# Patient Record
Sex: FEMALE | Race: WHITE | Employment: OTHER | URBAN - METROPOLITAN AREA
[De-identification: names, ages, dates, MRNs, and addresses within clinical notes are randomized per-mention and may not be internally consistent; named-entity substitution may affect disease eponyms.]

---

## 2020-07-08 ENCOUNTER — OFFICE VISIT (OUTPATIENT)
Dept: PODIATRY | Age: 70
End: 2020-07-08
Payer: COMMERCIAL

## 2020-07-08 VITALS — WEIGHT: 160 LBS | BODY MASS INDEX: 26.66 KG/M2 | HEIGHT: 65 IN

## 2020-07-08 PROCEDURE — 99203 OFFICE O/P NEW LOW 30 MIN: CPT | Performed by: PODIATRIST

## 2020-07-08 PROCEDURE — 17110 DESTRUCTION B9 LES UP TO 14: CPT | Performed by: PODIATRIST

## 2020-07-08 PROCEDURE — 11721 DEBRIDE NAIL 6 OR MORE: CPT | Performed by: PODIATRIST

## 2020-07-08 RX ORDER — AMOXICILLIN 500 MG/1
CAPSULE ORAL
COMMUNITY
Start: 2020-07-03 | End: 2020-09-10 | Stop reason: ALTCHOICE

## 2020-07-08 RX ORDER — CLOBETASOL PROPIONATE 0.5 MG/G
CREAM TOPICAL
COMMUNITY
Start: 2020-05-22

## 2020-07-09 RX ORDER — FLUOCINONIDE 0.5 MG/G
OINTMENT TOPICAL
Qty: 60 G | Refills: 2 | Status: SHIPPED | OUTPATIENT
Start: 2020-07-09 | End: 2021-03-17 | Stop reason: ALTCHOICE

## 2020-07-09 ASSESSMENT — ENCOUNTER SYMPTOMS
DIARRHEA: 0
VOMITING: 0
COLOR CHANGE: 1
CONSTIPATION: 0
NAUSEA: 0

## 2020-07-09 NOTE — PROGRESS NOTES
[facility]  New Patient History and Physical    Chief Complaint:   Chief Complaint   Patient presents with    Callouses     callouses to both feet    Hammer Toe     hammertoes b/l    Second Opinion       HPI: Naty Sanchez is a 68 y.o. female who presents to the office today complaining of several things    1) thick, toenails, she is a diabetic, toes are also painful. Saw another podiatrist. Jannifer Form are getting worse, wanted a 2nd opinion    2) dry, scaly, thick skin, both feet, cracks, has psoriasis. Uses carmol 40, used vaseline, under occlusion, this helped    3) painful spots right heel, right sub 1st met head    4) hammertoes, right worse than left, corn on toe, 3rd toe, very painful. Has spacers, toe crests, silicone toe caps, met pads, has insoles, not wearing them. Currently denies F/C/N/V. Allergies   Allergen Reactions    Latex        Past Medical History:   Diagnosis Date    Arthritis     Bilateral carpal tunnel syndrome     Chronic kidney disease     Diabetes mellitus ([facility] [address]     Diabetic nephropathy (HCC)     Hyperlipemia     Night cramps     Osteopenia     Seizure disorder (HCC)     Tingling in extremities        Prior to Admission medications    Medication Sig Start Date End Date Taking? Authorizing Provider   fluocinonide (LIDEX) 0.05 % ointment Apply topically 2 times daily.  7/9/20  Yes Maureen Smith DPM   clobetasol (TEMOVATE) 0.05 % cream  5/22/20  Yes Historical Provider, MD   L-Methylfolate-Algae-B12-B6 (METANX PO) Take by mouth   Yes Historical Provider, MD   lisinopril (PRINIVIL;ZESTRIL) 2.5 MG tablet Take 2.5 mg by mouth daily   Yes Historical Provider, MD   simvastatin (ZOCOR) 20 MG tablet Take 20 mg by mouth nightly   Yes Historical Provider, MD   potassium chloride (KLOR-CON M) 20 MEQ TBCR extended release tablet Take 20 mEq by mouth daily (with breakfast)   Yes Historical Provider, MD   metFORMIN (GLUCOPHAGE) 500 MG tablet Take 500 mg by mouth 2 times daily (with meals)   Yes Historical Provider, MD   SITagliptin (JANUVIA) 50 MG tablet Take 50 mg by mouth daily   Yes Historical Provider, MD   Multiple Vitamins-Minerals (CENTRUM SILVER 50+WOMEN) TABS Take by mouth daily   Yes Historical Provider, MD   Magnesium 500 MG TABS Take 500 mg by mouth daily   Yes Historical Provider, MD   aspirin 81 MG tablet Take 81 mg by mouth daily   Yes Historical Provider, MD   NONFORMULARY Take by mouth daily Neora Vitamin   Yes Historical Provider, MD   amoxicillin (AMOXIL) 500 MG capsule  7/3/20   Historical Provider, MD       Past Surgical History:   Procedure Laterality Date    CATARACT REMOVAL Bilateral        History reviewed. No pertinent family history. Social History     Tobacco Use    Smoking status: Never Smoker    Smokeless tobacco: Never Used   Substance Use Topics    Alcohol use: Never     Frequency: Never       Review of Systems   Constitutional: Negative for chills, diaphoresis, fatigue, fever and unexpected weight change. Cardiovascular: Negative for leg swelling. Gastrointestinal: Negative for constipation, diarrhea, nausea and vomiting. Musculoskeletal: Positive for gait problem. Negative for arthralgias and joint swelling. Skin: Positive for color change, rash and wound. Negative for pallor. Neurological: Positive for numbness. Negative for weakness. Lower Extremity Physical Examination:     Vitals: There were no vitals filed for this visit. General: AAO x 3 in NAD.      Vascular: DP and PT pulses palpable 2/4, Bilateral.  CFT <3 seconds, Bilateral.  Hair growth absent to the level of the digits, Bilateral.  Edema absent, Bilateral.  Varicosities absent, Bilateral. Erythema absent, Bilateral    Neurological:  Sensation present to light touch to level of digits, Bilateral.  Protective sensation absent via 5.07/10g [place] monofilament 3/10 sites, Bilateral.  Vibratory sensation present to 1st MPJ, Bilateral.     Musculoskeletal: personalized discharge instructions. Nails 1-10 were debrided sharply in length and thickness with a nipper and , without incident. Pt will follow up in 4 weeks or sooner if any problems arise. Diagnosis was discussed with the pt and all of their questions were answered in detail. Proper foot hygiene and care was discussed with the pt. Informed patient on proper diabetic foot care and importance of tight glycemic control. Patient to check feet daily and contact the office with any questions/problems/concerns. Other comorbidity noted and will be managed by PCP. Diabetic foot examination performed this visit. The exam included neurological sensory exam, a 10-g monofilament and pinprick sensation, vibration using a 128-Hz tuning fork, ankle reflexes, visual skin inspection, vascular exam including assessment of pedal pulses, orthopedic exam for deformities, and shoe inspection. Increased risk factors noted on the diabetic foot exam include decreased sensory exam and peripheral neuropathy. Shoegear inspected and found to be appropriate size and wear. Diabetic shoes and insoles: This patient would benefit from extra depth diabetic shoes and custom inserts. I feel he/she qualifies due to the above performed physical exam and diagnosis. I will do the appropriate paperwork and send it to their primary care physician. Then the patient will be measured for shoes and custom inserts to properly off load and protect his/her feet. Will discuss shoes at next visit  Will bring her other insoles and shoes to next visit    The lesion(s) was partially debrided, enucleated, and silver nitrate was applied with an apperature pad under occlusion. The patient will leave in place for 24-48 hours and than remove. The patient tolerated the procedure well and without complication.        Orders Placed This Encounter   Procedures    [place] DEBRIDEMENT OF NAILS, 6 OR MORE    49541 - [place] DESTRUCTION BENIGN LESIONS UP TO 14 Orders Placed This Encounter   Medications    fluocinonide (LIDEX) 0.05 % ointment     Sig: Apply topically 2 times daily. Dispense:  60 g     Refill:  2        RTC in 4week(s).     7/8/2020

## 2020-07-29 ENCOUNTER — OFFICE VISIT (OUTPATIENT)
Dept: PODIATRY | Age: 70
End: 2020-07-29
Payer: COMMERCIAL

## 2020-07-29 VITALS — WEIGHT: 156 LBS | BODY MASS INDEX: 25.99 KG/M2 | HEIGHT: 65 IN

## 2020-07-29 PROCEDURE — 17110 DESTRUCTION B9 LES UP TO 14: CPT | Performed by: PODIATRIST

## 2020-07-29 PROCEDURE — 99213 OFFICE O/P EST LOW 20 MIN: CPT | Performed by: PODIATRIST

## 2020-07-30 ASSESSMENT — ENCOUNTER SYMPTOMS
NAUSEA: 0
COLOR CHANGE: 1
VOMITING: 0
DIARRHEA: 0
CONSTIPATION: 0

## 2020-07-30 NOTE — PROGRESS NOTES
[facility]  Return Patient History and Physical    Chief Complaint:   Chief Complaint   Patient presents with   Mickiel Pelt     right foot        HPI: Garrett Navarro is a 68 y.o. female who presents to the office today for follow up of several things    1) thick, toenails, doing better after debridement, she is a diabetic, toes are also painful. Saw another podiatrist. Candice Daniels are getting worse, wanted a 2nd opinion    2) dry, scaly, thick skin, both feet, better with lidex ointment at night, cream in the morning. cracks, has psoriasis. Uses carmol 40, used vaseline, under occlusion, this helped    3) painful spots right heel, right sub 1st met head-better after debriement. 4) hammertoes, right 3rd toe is still painful with a corn. right worse than left, corn on toe, 3rd toe, very painful. Has spacers, toe crests, silicone toe caps, met pads, has insoles, not wearing them. Currently denies F/C/N/V. Allergies   Allergen Reactions    Latex        Past Medical History:   Diagnosis Date    Arthritis     Bilateral carpal tunnel syndrome     Chronic kidney disease     Diabetes mellitus ([facility] [address]     Diabetic nephropathy (HCC)     Hyperlipemia     Night cramps     Osteopenia     Seizure disorder (HCC)     Tingling in extremities        Prior to Admission medications    Medication Sig Start Date End Date Taking? Authorizing Provider   fluocinonide (LIDEX) 0.05 % ointment Apply topically 2 times daily.  7/9/20  Yes Zak Umanzor DPM   clobetasol (TEMOVATE) 0.05 % cream  5/22/20  Yes Historical Provider, MD   L-Methylfolate-Algae-B12-B6 (METANX PO) Take by mouth   Yes Historical Provider, MD   lisinopril (PRINIVIL;ZESTRIL) 2.5 MG tablet Take 2.5 mg by mouth daily   Yes Historical Provider, MD   simvastatin (ZOCOR) 20 MG tablet Take 20 mg by mouth nightly   Yes Historical Provider, MD   potassium chloride (KLOR-CON M) 20 MEQ TBCR extended release tablet Take 20 mEq by mouth daily (with breakfast)   Yes Historical Provider, MD   metFORMIN (GLUCOPHAGE) 500 MG tablet Take 500 mg by mouth 2 times daily (with meals)   Yes Historical Provider, MD   SITagliptin (JANUVIA) 50 MG tablet Take 50 mg by mouth daily   Yes Historical Provider, MD   Multiple Vitamins-Minerals (CENTRUM SILVER 50+WOMEN) TABS Take by mouth daily   Yes Historical Provider, MD   Magnesium 500 MG TABS Take 500 mg by mouth daily   Yes Historical Provider, MD   aspirin 81 MG tablet Take 81 mg by mouth daily   Yes Historical Provider, MD   NONFORMULARY Take by mouth daily Neora Vitamin   Yes Historical Provider, MD   amoxicillin (AMOXIL) 500 MG capsule  7/3/20   Historical Provider, MD       Past Surgical History:   Procedure Laterality Date    CATARACT REMOVAL Bilateral        No family history on file. Social History     Tobacco Use    Smoking status: Never Smoker    Smokeless tobacco: Never Used   Substance Use Topics    Alcohol use: Never     Frequency: Never       Review of Systems   Constitutional: Negative for chills, diaphoresis, fatigue, fever and unexpected weight change. Cardiovascular: Negative for leg swelling. Gastrointestinal: Negative for constipation, diarrhea, nausea and vomiting. Musculoskeletal: Positive for gait problem. Negative for arthralgias and joint swelling. Skin: Positive for color change, rash and wound. Negative for pallor. Neurological: Positive for numbness. Negative for weakness. Lower Extremity Physical Examination:     Vitals: There were no vitals filed for this visit. General: AAO x 3 in NAD.      Vascular: DP and PT pulses palpable 2/4, Bilateral.  CFT <3 seconds, Bilateral.  Hair growth absent to the level of the digits, Bilateral.  Edema absent, Bilateral.  Varicosities absent, Bilateral. Erythema absent, Bilateral    Neurological:  Sensation present to light touch to level of digits, Bilateral.  Protective sensation absent via 5.07/10g [place] monofilament 3/10 sites, Bilateral.  Vibratory sensation present to 1st MPJ, Bilateral.     Musculoskeletal: Muscle strength 5/5, Bilateral.  Contracted toes 2-5 right, HAV bilateral    Integument: Warm, dry, supple, Bilateral.  Open lesion absent, Bilateral.  Hyperkeratotic skin in patches bilateral feet, red, scaly, moccasin distribution. Hyperkeratotic lesions plantar right heel improved, plantar right first met head improved, tip of right 3rd toe with hard central painful core still present. Gait analysis:     Asessment: Patient is a 68 y.o. female with:   1. Psoriasis of nail    2. Psoriasis    3. Benign neoplasm of skin of foot, right    4. Pain in right foot    5. Hammer toe of right foot    6. Corn of toe    7. Controlled type 2 diabetes with neuropathy ([facility] [place] [address]          Plan: Pt was evaluated and examined. Patient was given personalized discharge instructions. Pt will follow up in 4 weeks or sooner if any problems arise. Diagnosis was discussed with the pt and all of their questions were answered in detail. Proper foot hygiene and care was discussed with the pt. Informed patient on proper diabetic foot care and importance of tight glycemic control. Patient to check feet daily and contact the office with any questions/problems/concerns. Other comorbidity noted and will be managed by PCP. Diabetic foot examination performed this visit. The exam included neurological sensory exam, a 10-g monofilament and pinprick sensation, vibration using a 128-Hz tuning fork, ankle reflexes, visual skin inspection, vascular exam including assessment of pedal pulses, orthopedic exam for deformities, and shoe inspection. Increased risk factors noted on the diabetic foot exam include decreased sensory exam and peripheral neuropathy. Shoegear inspected and found to be appropriate size and wear.     Modified her insoles for her shoes with pink plastazote  Discussed flexor tenotomy for the right 3rd to to try to help with pressure on the

## 2020-08-27 ENCOUNTER — PROCEDURE VISIT (OUTPATIENT)
Dept: PODIATRY | Age: 70
End: 2020-08-27
Payer: COMMERCIAL

## 2020-08-27 VITALS — WEIGHT: 156 LBS | BODY MASS INDEX: 25.99 KG/M2 | HEIGHT: 65 IN

## 2020-08-27 PROCEDURE — 28232 INCISION OF TOE TENDON: CPT | Performed by: PODIATRIST

## 2020-08-27 PROCEDURE — 99999 PR OFFICE/OUTPT VISIT,PROCEDURE ONLY: CPT | Performed by: PODIATRIST

## 2020-08-27 ASSESSMENT — ENCOUNTER SYMPTOMS
VOMITING: 0
NAUSEA: 0
DIARRHEA: 0
COLOR CHANGE: 0
CONSTIPATION: 0

## 2020-08-27 NOTE — PROGRESS NOTES
[facility]  Return Patient     Garett Santana 68 y.o. female that presents for follow-up of   Chief Complaint   Patient presents with    Procedure     RIGHT FOOT 3RD TOE TENOTOMY     Here for flexor tenotomy of her right 3rd toe. hammertoes, right 3rd toe is still painful with a corn. right worse than left, corn on toe, 3rd toe, very painful. Has spacers, toe crests, silicone toe     Currently denies F/C/N/V. Allergies   Allergen Reactions    Latex        Past Medical History:   Diagnosis Date    Arthritis     Bilateral carpal tunnel syndrome     Chronic kidney disease     Diabetes mellitus ([address]     Diabetic nephropathy (HCC)     Hyperlipemia     Night cramps     Osteopenia     Seizure disorder (HCC)     Tingling in extremities        Prior to Admission medications    Medication Sig Start Date End Date Taking? Authorizing Provider   fluocinonide (LIDEX) 0.05 % ointment Apply topically 2 times daily.  7/9/20  Yes Saman Bay DPM   amoxicillin (AMOXIL) 500 MG capsule  7/3/20  Yes Historical Provider, MD   clobetasol (TEMOVATE) 0.05 % cream  5/22/20  Yes Historical Provider, MD   L-Methylfolate-Algae-B12-B6 (METANX PO) Take by mouth   Yes Historical Provider, MD   lisinopril (PRINIVIL;ZESTRIL) 2.5 MG tablet Take 2.5 mg by mouth daily   Yes Historical Provider, MD   simvastatin (ZOCOR) 20 MG tablet Take 20 mg by mouth nightly   Yes Historical Provider, MD   potassium chloride (KLOR-CON M) 20 MEQ TBCR extended release tablet Take 20 mEq by mouth daily (with breakfast)   Yes Historical Provider, MD   metFORMIN (GLUCOPHAGE) 500 MG tablet Take 500 mg by mouth 2 times daily (with meals)   Yes Historical Provider, MD   SITagliptin (JANUVIA) 50 MG tablet Take 50 mg by mouth daily   Yes Historical Provider, MD   Multiple Vitamins-Minerals (CENTRUM SILVER 50+WOMEN) TABS Take by mouth daily   Yes Historical Provider, MD   Magnesium 500 MG TABS Take 500 mg by mouth daily   Yes Historical Provider, MD   aspirin 81 MG tablet Take 81 mg by mouth daily   Yes Historical Provider, MD   NONFORMULARY Take by mouth daily Neora Vitamin   Yes Historical Provider, MD       Review of Systems   Constitutional: Negative for chills, diaphoresis, fatigue, fever and unexpected weight change. Cardiovascular: Negative for leg swelling. Gastrointestinal: Negative for constipation, diarrhea, nausea and vomiting. Musculoskeletal: Positive for gait problem. Negative for arthralgias and joint swelling. Skin: Negative for color change, pallor, rash and wound. Neurological: Negative for weakness and numbness. Lower Extremity Physical Examination:     Vitals: There were no vitals filed for this visit. General: AAO x 3 in NAD. Integument:   Warm, dry, supple, Bilateral.  Open lesion absent, Bilateral.     Vascular: DP and PT pulses palpable 2/4, Bilateral.  CFT <3 seconds, Bilateral.  Hair growth absent to the level of the digits, Bilateral.  Edema absent, Bilateral.  Varicosities absent, Bilateral. Erythema absent, Bilateral    Neurological:  Sensation present to light touch to level of digits, Bilateral.    Musculoskeletal: Muscle strength 5/5, Bilateral.  Right 3rd toe, contracted, reducible, painful hyperkeratotic lesion on the tip. Asessment: Patient is a 68 y.o. female with:   1. Benign neoplasm of skin of foot, right    2. Hammer toe of right foot    3. Pain in right foot        Plan: Patient examined and evaluated. Current condition and treatment options discussed in detail. Verbal and written instructions given to patient. Contact office with any questions/problems/concerns. Procedure:  Flexor Tenotomy  Anatomic Location: right 3rd toe    Verbal and written consent obtained from patient for Flexor tenotomy of the right 3rd toe after all questions answered. This area was prepped with an alcohol swab and 3 cc total of 2% lidocaine plain was injected to the right 3rd toe.   The toe was

## 2020-09-10 ENCOUNTER — OFFICE VISIT (OUTPATIENT)
Dept: PODIATRY | Age: 70
End: 2020-09-10

## 2020-09-10 VITALS — HEIGHT: 65 IN | BODY MASS INDEX: 25.99 KG/M2 | WEIGHT: 156 LBS

## 2020-09-10 PROCEDURE — 99024 POSTOP FOLLOW-UP VISIT: CPT | Performed by: PODIATRIST

## 2020-09-10 ASSESSMENT — ENCOUNTER SYMPTOMS
CONSTIPATION: 0
COLOR CHANGE: 0
NAUSEA: 0
DIARRHEA: 0
VOMITING: 0

## 2020-10-01 ENCOUNTER — OFFICE VISIT (OUTPATIENT)
Dept: PODIATRY | Age: 70
End: 2020-10-01
Payer: COMMERCIAL

## 2020-10-01 VITALS — BODY MASS INDEX: 25.99 KG/M2 | WEIGHT: 156 LBS | HEIGHT: 65 IN

## 2020-10-01 PROCEDURE — 17110 DESTRUCTION B9 LES UP TO 14: CPT | Performed by: PODIATRIST

## 2020-10-01 PROCEDURE — 99024 POSTOP FOLLOW-UP VISIT: CPT | Performed by: PODIATRIST

## 2020-10-01 RX ORDER — POTASSIUM CHLORIDE 1500 MG/1
TABLET, EXTENDED RELEASE ORAL
COMMUNITY
Start: 2020-09-10

## 2020-10-01 ASSESSMENT — ENCOUNTER SYMPTOMS
VOMITING: 0
COLOR CHANGE: 0
NAUSEA: 0
CONSTIPATION: 0
DIARRHEA: 0

## 2020-10-01 NOTE — PROGRESS NOTES
[facility]  Return Patient     Meenakshi James 65 y.o. female that presents for follow-up of   Chief Complaint   Patient presents with    Follow-up     B/L FEET     Follow up hyperkeratosis both feet, foot pain, psoriasis. She thinks she is having a reaction to the [place] cream. Using lidex ointment. This has helped. Follow up flexor tenotomy of her right 3rd toe. She is doing very well, no pain. History of hammertoes, right 3rd toe is still painful with a corn. right worse than left, corn on toe, 3rd toe, very painful. Has spacers, toe crests, silicone toe     Currently denies F/C/N/V. Allergies   Allergen Reactions    Latex        Past Medical History:   Diagnosis Date    Arthritis     Bilateral carpal tunnel syndrome     Chronic kidney disease     Diabetes mellitus ([facility] [place] [address]     Diabetic nephropathy (HCC)     Hyperlipemia     Night cramps     Osteopenia     Seizure disorder (HCC)     Tingling in extremities        Prior to Admission medications    Medication Sig Start Date End Date Taking? Authorizing Provider   KLOR-CON M20 20 MEQ extended release tablet  9/10/20  Yes Historical Provider, MD   fluocinonide (LIDEX) 0.05 % ointment Apply topically 2 times daily.  7/9/20  Yes Aviva Lagunas DPM   clobetasol (TEMOVATE) 0.05 % cream  5/22/20  Yes Historical Provider, MD   L-Methylfolate-Algae-B12-B6 (METANX PO) Take by mouth   Yes Historical Provider, MD   lisinopril (PRINIVIL;ZESTRIL) 2.5 MG tablet Take 2.5 mg by mouth daily   Yes Historical Provider, MD   simvastatin (ZOCOR) 20 MG tablet Take 20 mg by mouth nightly   Yes Historical Provider, MD   potassium chloride (KLOR-CON M) 20 MEQ TBCR extended release tablet Take 20 mEq by mouth daily (with breakfast)   Yes Historical Provider, MD   metFORMIN (GLUCOPHAGE) 500 MG tablet Take 500 mg by mouth 2 times daily (with meals)   Yes Historical Provider, MD   SITagliptin (JANUVIA) 50 MG tablet Take 50 mg by mouth daily   Yes Historical Provider, MD   Multiple Vitamins-Minerals (CENTRUM SILVER 50+WOMEN) TABS Take by mouth daily   Yes Historical Provider, MD   Magnesium 500 MG TABS Take 500 mg by mouth daily   Yes Historical Provider, MD   aspirin 81 MG tablet Take 81 mg by mouth daily   Yes Historical Provider, MD   NONFORMULARY Take by mouth daily Neora Vitamin   Yes Historical Provider, MD       Review of Systems   Constitutional: Negative for chills, diaphoresis, fatigue, fever and unexpected weight change. Cardiovascular: Negative for leg swelling. Gastrointestinal: Negative for constipation, diarrhea, nausea and vomiting. Musculoskeletal: Positive for gait problem. Negative for arthralgias and joint swelling. Skin: Negative for color change, pallor, rash and wound. Neurological: Negative for weakness and numbness. Lower Extremity Physical Examination:     Vitals: There were no vitals filed for this visit. General: AAO x 3 in NAD. Integument:   Warm, dry, supple, Bilateral.  Open lesion absent, Bilateral. hyperkeratosis sub 1 right, sub 5th left. Feet improved. Dry scaly skin scattered. Vascular: DP and PT pulses palpable 2/4, Bilateral.  CFT <3 seconds, Bilateral.  Hair growth absent to the level of the digits, Bilateral.  Edema absent, Bilateral.  Varicosities absent, Bilateral. Erythema absent, Bilateral    Neurological:  Sensation present to light touch to level of digits, Bilateral.    Musculoskeletal: Muscle strength 5/5, Bilateral.  Right 3rd toe, sitting straight, hyperkeratosis resolved    Asessment: Patient is a 65 y.o. female with:   1. Benign neoplasm of skin of foot, right    2. Hammer toe of right foot    3. Pain in right foot    4. Psoriasis of nail    5. Psoriasis    6. Corn of toe    7. Controlled type 2 diabetes with neuropathy ([facility] [address]    8. Onychomycosis    9. Benign neoplasm of skin of foot, left        Plan: Patient examined and evaluated. Current condition and treatment options discussed in detail. Verbal and written instructions given to patient. Contact office with any questions/problems/concerns. Try Aquaphor daily  Lidex ointment daily    The lesion(s) was partially debrided, enucleated, and silver nitrate was applied with an apperature pad under occlusion. The patient will leave in place for 24-48 hours and than remove. The patient tolerated the procedure well and without complication. Orders Placed This Encounter   Procedures    60053 - [place] DESTRUCTION BENIGN LESIONS UP TO 14     No orders of the defined types were placed in this encounter.        RTC in 1 months  10/1/2020

## 2020-10-29 ENCOUNTER — OFFICE VISIT (OUTPATIENT)
Dept: PODIATRY | Age: 70
End: 2020-10-29
Payer: COMMERCIAL

## 2020-10-29 VITALS — HEIGHT: 65 IN | WEIGHT: 156 LBS | BODY MASS INDEX: 25.99 KG/M2

## 2020-10-29 PROCEDURE — 99213 OFFICE O/P EST LOW 20 MIN: CPT | Performed by: PODIATRIST

## 2020-10-29 ASSESSMENT — ENCOUNTER SYMPTOMS
COLOR CHANGE: 0
DIARRHEA: 0
NAUSEA: 0
VOMITING: 0
CONSTIPATION: 0

## 2020-10-29 NOTE — PROGRESS NOTES
[facility]  Return Patient     Bull Floyd 65 y.o. female that presents for follow-up of   Chief Complaint   Patient presents with    Callouses     b/l feet      Follow up hyperkeratosis both feet, foot pain, psoriasis. Using Aquaphor and Camea cream. Using lidex ointment. This has helped. Follow up flexor tenotomy of her right 3rd toe. She is doing very well, no pain. Has a new painful callous right heel. Left sub 5th met head. History of hammertoes, right 3rd toe is still painful with a corn. right worse than left, corn on toe, 3rd toe, very painful. Has spacers, toe crests, silicone toe     Currently denies F/C/N/V. Allergies   Allergen Reactions    Latex        Past Medical History:   Diagnosis Date    Arthritis     Bilateral carpal tunnel syndrome     Chronic kidney disease     Diabetes mellitus ([facility] [address]     Diabetic nephropathy (HCC)     Hyperlipemia     Night cramps     Osteopenia     Seizure disorder (HCC)     Tingling in extremities        Prior to Admission medications    Medication Sig Start Date End Date Taking? Authorizing Provider   fluocinonide (LIDEX) 0.05 % cream Apply topically 2 times daily. 10/29/20  Yes LAKISHA Perdomo M20 20 MEQ extended release tablet  9/10/20  Yes Historical Provider, MD   fluocinonide (LIDEX) 0.05 % ointment Apply topically 2 times daily.  7/9/20  Yes Adolph Lambert DPM   clobetasol (TEMOVATE) 0.05 % cream  5/22/20  Yes Historical Provider, MD   L-Methylfolate-Algae-B12-B6 (METANX PO) Take by mouth   Yes Historical Provider, MD   lisinopril (PRINIVIL;ZESTRIL) 2.5 MG tablet Take 2.5 mg by mouth daily   Yes Historical Provider, MD   simvastatin (ZOCOR) 20 MG tablet Take 20 mg by mouth nightly   Yes Historical Provider, MD   potassium chloride (KLOR-CON M) 20 MEQ TBCR extended release tablet Take 20 mEq by mouth daily (with breakfast)   Yes Historical Provider, MD   metFORMIN (GLUCOPHAGE) 500 MG tablet Take 500 mg by mouth 2 times daily (with meals)   Yes Historical Provider, MD   SITagliptin (JANUVIA) 50 MG tablet Take 50 mg by mouth daily   Yes Historical Provider, MD   Multiple Vitamins-Minerals (CENTRUM SILVER 50+WOMEN) TABS Take by mouth daily   Yes Historical Provider, MD   Magnesium 500 MG TABS Take 500 mg by mouth daily   Yes Historical Provider, MD   aspirin 81 MG tablet Take 81 mg by mouth daily   Yes Historical Provider, MD   NONFORMULARY Take by mouth daily Neora Vitamin   Yes Historical Provider, MD       Review of Systems   Constitutional: Negative for chills, diaphoresis, fatigue, fever and unexpected weight change. Cardiovascular: Negative for leg swelling. Gastrointestinal: Negative for constipation, diarrhea, nausea and vomiting. Musculoskeletal: Positive for gait problem. Negative for arthralgias and joint swelling. Skin: Negative for color change, pallor, rash and wound. Neurological: Negative for weakness and numbness. Lower Extremity Physical Examination:     Vitals: There were no vitals filed for this visit. General: AAO x 3 in NAD. Integument:   Warm, dry, supple, Bilateral.  Open lesion absent, Bilateral. hyperkeratosis sub heel right, sub 5th left. Feet improved. Dry scaly skin scattered. Vascular: DP and PT pulses palpable 2/4, Bilateral.  CFT <3 seconds, Bilateral.  Hair growth absent to the level of the digits, Bilateral.  Edema absent, Bilateral.  Varicosities absent, Bilateral. Erythema absent, Bilateral    Neurological:  Sensation present to light touch to level of digits, Bilateral.    Musculoskeletal: Muscle strength 5/5, Bilateral.  Right 3rd toe, sitting straight, hyperkeratosis resolved    Asessment: Patient is a 65 y.o. female with:   1. Benign neoplasm of skin of foot, right    2. Hammer toe of right foot    3. Pain in right foot    4. Psoriasis of nail    5. Psoriasis    6. Corn of toe    7. Controlled type 2 diabetes with neuropathy ([address]    8. Onychomycosis    9.  Benign neoplasm of skin of foot, left        Plan: Patient examined and evaluated. Current condition and treatment options discussed in detail. Verbal and written instructions given to patient. Contact office with any questions/problems/concerns. Aquaphor daily  Lidex cream, will switch-Rx written    The lesion(s) was partially debrided, enucleated, and silver nitrate was applied with an apperature pad under occlusion. The patient will leave in place for 24-48 hours and than remove. The patient tolerated the procedure well and without complication. No orders of the defined types were placed in this encounter. Orders Placed This Encounter   Medications    fluocinonide (LIDEX) 0.05 % cream     Sig: Apply topically 2 times daily.      Dispense:  30 g     Refill:  2        RTC in 1 months  10/29/2020

## 2020-12-03 ENCOUNTER — OFFICE VISIT (OUTPATIENT)
Dept: PODIATRY | Age: 70
End: 2020-12-03
Payer: COMMERCIAL

## 2020-12-03 VITALS — HEIGHT: 65 IN | WEIGHT: 156 LBS | BODY MASS INDEX: 25.99 KG/M2

## 2020-12-03 PROCEDURE — 99213 OFFICE O/P EST LOW 20 MIN: CPT | Performed by: PODIATRIST

## 2020-12-04 ASSESSMENT — ENCOUNTER SYMPTOMS
CONSTIPATION: 0
NAUSEA: 0
COLOR CHANGE: 0
VOMITING: 0
DIARRHEA: 0

## 2020-12-04 NOTE — PROGRESS NOTES
[facility]  Return Patient     Pato Heading 65 y.o. female that presents for follow-up of   Chief Complaint   Patient presents with    Callouses     callous trim b/l    Diabetes     blood sugar 86     Follow up hyperkeratosis both feet, foot pain, psoriasis. Using Aquaphor and Camea cream. Using lidex ointment. This has helped. Follow up flexor tenotomy of her right 3rd toe. She is doing very well, no pain. Has a new painful callous right heel. Left sub 5th met head. History of hammertoes, right 3rd toe is still painful with a corn. right worse than left, corn on toe, 3rd toe, very painful. Has spacers, toe crests, silicone toe     Currently denies F/C/N/V. Allergies   Allergen Reactions    Latex        Past Medical History:   Diagnosis Date    Arthritis     Bilateral carpal tunnel syndrome     Chronic kidney disease     Diabetes mellitus ([facility] [address]     Diabetic nephropathy (HCC)     Hyperlipemia     Night cramps     Osteopenia     Seizure disorder (HCC)     Tingling in extremities        Prior to Admission medications    Medication Sig Start Date End Date Taking? Authorizing Provider   KLOR-CON M20 20 MEQ extended release tablet  9/10/20  Yes Historical Provider, MD   fluocinonide (LIDEX) 0.05 % ointment Apply topically 2 times daily.  7/9/20  Yes Arianna Wild DPM   clobetasol (TEMOVATE) 0.05 % cream  5/22/20  Yes Historical Provider, MD   L-Methylfolate-Algae-B12-B6 (METANX PO) Take by mouth   Yes Historical Provider, MD   lisinopril (PRINIVIL;ZESTRIL) 2.5 MG tablet Take 2.5 mg by mouth daily   Yes Historical Provider, MD   simvastatin (ZOCOR) 20 MG tablet Take 20 mg by mouth nightly   Yes Historical Provider, MD   potassium chloride (KLOR-CON M) 20 MEQ TBCR extended release tablet Take 20 mEq by mouth daily (with breakfast)   Yes Historical Provider, MD   metFORMIN (GLUCOPHAGE) 500 MG tablet Take 500 mg by mouth 2 times daily (with meals)   Yes Historical Provider, MD SITagliptin (JANUVIA) 50 MG tablet Take 50 mg by mouth daily   Yes Historical Provider, MD   Multiple Vitamins-Minerals (CENTRUM SILVER 50+WOMEN) TABS Take by mouth daily   Yes Historical Provider, MD   Magnesium 500 MG TABS Take 500 mg by mouth daily   Yes Historical Provider, MD   aspirin 81 MG tablet Take 81 mg by mouth daily   Yes Historical Provider, MD   NONFORMULARY Take by mouth daily Neora Vitamin   Yes Historical Provider, MD       Review of Systems   Constitutional: Negative for chills, diaphoresis, fatigue, fever and unexpected weight change. Cardiovascular: Negative for leg swelling. Gastrointestinal: Negative for constipation, diarrhea, nausea and vomiting. Musculoskeletal: Positive for gait problem. Negative for arthralgias and joint swelling. Skin: Negative for color change, pallor, rash and wound. Neurological: Negative for weakness and numbness. Lower Extremity Physical Examination:     Vitals: There were no vitals filed for this visit. General: AAO x 3 in NAD. Integument:   Warm, dry, supple, Bilateral.  Open lesion absent, Bilateral. hyperkeratosis sub heel right, sub 5th left. Feet improved. Dry scaly skin scattered. Vascular: DP and PT pulses palpable 2/4, Bilateral.  CFT <3 seconds, Bilateral.  Hair growth absent to the level of the digits, Bilateral.  Edema absent, Bilateral.  Varicosities absent, Bilateral. Erythema absent, Bilateral    Neurological:  Sensation present to light touch to level of digits, Bilateral.    Musculoskeletal: Muscle strength 5/5, Bilateral.  Right 3rd toe, sitting straight, hyperkeratosis resolved    Asessment: Patient is a 65 y.o. female with:   1. Benign neoplasm of skin of foot, right    2. Hammer toe of right foot    3. Pain in right foot    4. Psoriasis of nail    5. Psoriasis    6. Corn of toe    7. Controlled type 2 diabetes with neuropathy ([facility] [address]    8. Benign neoplasm of skin of foot, left        Plan: Patient examined and evaluated. Current condition and treatment options discussed in detail. Verbal and written instructions given to patient. Contact office with any questions/problems/concerns. Aquaphor daily  Lidex cream, will switch-Rx written    The lesion(s) was partially debrided, enucleated, and silver nitrate was applied with an apperature pad under occlusion. The patient will leave in place for 24-48 hours and than remove. The patient tolerated the procedure well and without complication. No orders of the defined types were placed in this encounter. No orders of the defined types were placed in this encounter.        RTC in 1 months  12/3/2020

## 2021-01-19 ENCOUNTER — OFFICE VISIT (OUTPATIENT)
Dept: PODIATRY | Age: 71
End: 2021-01-19
Payer: COMMERCIAL

## 2021-01-19 VITALS — HEIGHT: 65 IN | WEIGHT: 156 LBS | BODY MASS INDEX: 25.99 KG/M2

## 2021-01-19 DIAGNOSIS — B35.1 ONYCHOMYCOSIS: ICD-10-CM

## 2021-01-19 DIAGNOSIS — E11.40 CONTROLLED TYPE 2 DIABETES WITH NEUROPATHY (HCC): ICD-10-CM

## 2021-01-19 DIAGNOSIS — L40.9 PSORIASIS OF NAIL: ICD-10-CM

## 2021-01-19 DIAGNOSIS — M79.671 PAIN IN RIGHT FOOT: ICD-10-CM

## 2021-01-19 DIAGNOSIS — L40.9 PSORIASIS: ICD-10-CM

## 2021-01-19 DIAGNOSIS — M20.41 HAMMER TOE OF RIGHT FOOT: ICD-10-CM

## 2021-01-19 DIAGNOSIS — D23.72 BENIGN NEOPLASM OF SKIN OF FOOT, LEFT: ICD-10-CM

## 2021-01-19 DIAGNOSIS — D23.71 BENIGN NEOPLASM OF SKIN OF FOOT, RIGHT: Primary | ICD-10-CM

## 2021-01-19 DIAGNOSIS — L84 CORN OF TOE: ICD-10-CM

## 2021-01-19 PROCEDURE — 99213 OFFICE O/P EST LOW 20 MIN: CPT | Performed by: PODIATRIST

## 2021-01-19 ASSESSMENT — ENCOUNTER SYMPTOMS
VOMITING: 0
CONSTIPATION: 0
NAUSEA: 0
COLOR CHANGE: 0
DIARRHEA: 0

## 2021-01-19 NOTE — PROGRESS NOTES
[facility]  Return Patient     Endy Marin 65 y.o. female that presents for follow-up of   Chief Complaint   Patient presents with   Holli Guerrier / Last seen Versa MD Brianne 08/2020 per pt     Diabetes     LBS 96      Follow up hyperkeratosis both feet, foot pain, psoriasis. Using Aquaphor and Camea cream. Using lidex ointment. This has helped. Follow up flexor tenotomy of her right 3rd toe. She is doing very well, no pain. Has a new painful callous right heel. Left sub 5th met head. History of hammertoes, right 3rd toe is still painful with a corn. right worse than left, corn on toe, 3rd toe, very painful. Has spacers, toe crests, silicone toe     Currently denies F/C/N/V. Allergies   Allergen Reactions    Latex        Past Medical History:   Diagnosis Date    Arthritis     Bilateral carpal tunnel syndrome     Chronic kidney disease     Diabetes mellitus ([facility] [address]     Diabetic nephropathy (HCC)     Hyperlipemia     Night cramps     Osteopenia     Seizure disorder (HCC)     Tingling in extremities        Prior to Admission medications    Medication Sig Start Date End Date Taking? Authorizing Provider   KLOR-CON M20 20 MEQ extended release tablet  9/10/20  Yes Historical Provider, MD   fluocinonide (LIDEX) 0.05 % ointment Apply topically 2 times daily.  7/9/20  Yes Francine Jackson DPM   clobetasol (TEMOVATE) 0.05 % cream  5/22/20  Yes Historical Provider, MD   L-Methylfolate-Algae-B12-B6 (METANX PO) Take by mouth   Yes Historical Provider, MD   lisinopril (PRINIVIL;ZESTRIL) 2.5 MG tablet Take 2.5 mg by mouth daily   Yes Historical Provider, MD   simvastatin (ZOCOR) 20 MG tablet Take 20 mg by mouth nightly   Yes Historical Provider, MD   potassium chloride (KLOR-CON M) 20 MEQ TBCR extended release tablet Take 20 mEq by mouth daily (with breakfast)   Yes Historical Provider, MD metFORMIN (GLUCOPHAGE) 500 MG tablet Take 500 mg by mouth 2 times daily (with meals)   Yes Historical Provider, MD   SITagliptin (JANUVIA) 50 MG tablet Take 50 mg by mouth daily   Yes Historical Provider, MD   Multiple Vitamins-Minerals (CENTRUM SILVER 50+WOMEN) TABS Take by mouth daily   Yes Historical Provider, MD   Magnesium 500 MG TABS Take 500 mg by mouth daily   Yes Historical Provider, MD   aspirin 81 MG tablet Take 81 mg by mouth daily   Yes Historical Provider, MD   NONFORMULARY Take by mouth daily Neora Vitamin   Yes Historical Provider, MD       Review of Systems   Constitutional: Negative for chills, diaphoresis, fatigue, fever and unexpected weight change. Cardiovascular: Negative for leg swelling. Gastrointestinal: Negative for constipation, diarrhea, nausea and vomiting. Musculoskeletal: Positive for gait problem. Negative for arthralgias and joint swelling. Skin: Negative for color change, pallor, rash and wound. Neurological: Negative for weakness and numbness. Lower Extremity Physical Examination:     Vitals: There were no vitals filed for this visit. General: AAO x 3 in NAD. Integument:   Warm, dry, supple, Bilateral.  Open lesion absent, Bilateral. hyperkeratosis sub heel right, sub 5th left. Feet improved. Dry scaly skin scattered. Vascular: DP and PT pulses palpable 2/4, Bilateral.  CFT <3 seconds, Bilateral.  Hair growth absent to the level of the digits, Bilateral.  Edema absent, Bilateral.  Varicosities absent, Bilateral. Erythema absent, Bilateral    Neurological:  Sensation present to light touch to level of digits, Bilateral.    Musculoskeletal: Muscle strength 5/5, Bilateral.  Right 3rd toe, sitting straight, hyperkeratosis resolved    Asessment: Patient is a 65 y.o. female with:   1. Benign neoplasm of skin of foot, right    2. Hammer toe of right foot    3. Pain in right foot    4. Psoriasis of nail    5. Psoriasis    6.  Corn of toe 7. Controlled type 2 diabetes with neuropathy ([facility] [address]    8. Benign neoplasm of skin of foot, left    9. Onychomycosis        Plan: Patient examined and evaluated. Current condition and treatment options discussed in detail. Verbal and written instructions given to patient. Contact office with any questions/problems/concerns. Aquaphor daily  Lidex cream, will switch-Rx written    The lesion(s) was partially debrided, enucleated, and silver nitrate was applied with an apperature pad under occlusion. The patient will leave in place for 24-48 hours and than remove. The patient tolerated the procedure well and without complication. No orders of the defined types were placed in this encounter. No orders of the defined types were placed in this encounter.        RTC in 1 months  1/19/2021

## 2021-02-17 ENCOUNTER — OFFICE VISIT (OUTPATIENT)
Dept: PODIATRY | Age: 71
End: 2021-02-17
Payer: COMMERCIAL

## 2021-02-17 VITALS — HEIGHT: 65 IN | BODY MASS INDEX: 25.96 KG/M2

## 2021-02-17 DIAGNOSIS — D23.71 BENIGN NEOPLASM OF SKIN OF FOOT, RIGHT: Primary | ICD-10-CM

## 2021-02-17 DIAGNOSIS — M79.671 PAIN IN RIGHT FOOT: ICD-10-CM

## 2021-02-17 DIAGNOSIS — B35.1 ONYCHOMYCOSIS: ICD-10-CM

## 2021-02-17 DIAGNOSIS — D23.72 BENIGN NEOPLASM OF SKIN OF FOOT, LEFT: ICD-10-CM

## 2021-02-17 DIAGNOSIS — L40.9 PSORIASIS OF NAIL: ICD-10-CM

## 2021-02-17 DIAGNOSIS — M20.41 HAMMER TOE OF RIGHT FOOT: ICD-10-CM

## 2021-02-17 DIAGNOSIS — L84 CORN OF TOE: ICD-10-CM

## 2021-02-17 DIAGNOSIS — L40.9 PSORIASIS: ICD-10-CM

## 2021-02-17 DIAGNOSIS — E11.40 CONTROLLED TYPE 2 DIABETES WITH NEUROPATHY (HCC): ICD-10-CM

## 2021-02-17 PROCEDURE — 99213 OFFICE O/P EST LOW 20 MIN: CPT | Performed by: PODIATRIST

## 2021-02-17 ASSESSMENT — ENCOUNTER SYMPTOMS
CONSTIPATION: 0
NAUSEA: 0
DIARRHEA: 0
VOMITING: 0
COLOR CHANGE: 0

## 2021-02-17 NOTE — PROGRESS NOTES
Onychomycosis        Plan: Patient examined and evaluated. Current condition and treatment options discussed in detail. Verbal and written instructions given to patient. Contact office with any questions/problems/concerns. Aquaphor daily  Lidex cream, will switch-Rx written    The lesion(s) was partially debrided, enucleated, and silver nitrate was applied with an apperature pad under occlusion. The patient will leave in place for 24-48 hours and than remove. The patient tolerated the procedure well and without complication. No orders of the defined types were placed in this encounter. No orders of the defined types were placed in this encounter.        RTC in 1 months  2/17/2021

## 2021-03-17 ENCOUNTER — OFFICE VISIT (OUTPATIENT)
Dept: PODIATRY | Age: 71
End: 2021-03-17
Payer: COMMERCIAL

## 2021-03-17 VITALS — WEIGHT: 156 LBS | HEIGHT: 65 IN | BODY MASS INDEX: 25.99 KG/M2

## 2021-03-17 DIAGNOSIS — D23.72 BENIGN NEOPLASM OF SKIN OF FOOT, LEFT: ICD-10-CM

## 2021-03-17 DIAGNOSIS — M20.41 HAMMER TOE OF RIGHT FOOT: ICD-10-CM

## 2021-03-17 DIAGNOSIS — B35.1 ONYCHOMYCOSIS: ICD-10-CM

## 2021-03-17 DIAGNOSIS — L40.9 PSORIASIS: ICD-10-CM

## 2021-03-17 DIAGNOSIS — L40.9 PSORIASIS OF NAIL: ICD-10-CM

## 2021-03-17 DIAGNOSIS — M79.671 PAIN IN RIGHT FOOT: ICD-10-CM

## 2021-03-17 DIAGNOSIS — E11.40 CONTROLLED TYPE 2 DIABETES WITH NEUROPATHY (HCC): ICD-10-CM

## 2021-03-17 DIAGNOSIS — D23.71 BENIGN NEOPLASM OF SKIN OF FOOT, RIGHT: Primary | ICD-10-CM

## 2021-03-17 DIAGNOSIS — L84 CORN OF TOE: ICD-10-CM

## 2021-03-17 PROCEDURE — 99213 OFFICE O/P EST LOW 20 MIN: CPT | Performed by: PODIATRIST

## 2021-03-17 ASSESSMENT — ENCOUNTER SYMPTOMS
NAUSEA: 0
COLOR CHANGE: 0
VOMITING: 0
DIARRHEA: 0
CONSTIPATION: 0

## 2021-03-17 NOTE — PROGRESS NOTES
[facility]  Return Patient     Mauricio Sampson 65 y.o. female that presents for follow-up of   Chief Complaint   Patient presents with   Luvenia Brood     left foot/last saw Rosa Elena Combs 8/2020    Diabetes     blood sugar 92      Follow up hyperkeratosis both feet, foot pain, psoriasis. Using Aquaphor and Camea cream. Using lidex ointment. This has helped. Follow up flexor tenotomy of her right 3rd toe. She is doing very well, no pain. Has a new painful callous right heel. Left sub 5th met head. History of hammertoes, right 3rd toe is still painful with a corn. right worse than left, corn on toe, 3rd toe, very painful. Has spacers, toe crests, silicone toe     Currently denies F/C/N/V. Allergies   Allergen Reactions    Latex        Past Medical History:   Diagnosis Date    Arthritis     Bilateral carpal tunnel syndrome     Chronic kidney disease     Diabetes mellitus ([facility] [address]     Diabetic nephropathy (HCC)     Hyperlipemia     Night cramps     Osteopenia     Seizure disorder (HCC)     Tingling in extremities        Prior to Admission medications    Medication Sig Start Date End Date Taking?  Authorizing Provider   KLOR-CON M20 20 MEQ extended release tablet  9/10/20  Yes Historical Provider, MD   clobetasol (TEMOVATE) 0.05 % cream  5/22/20  Yes Historical Provider, MD   L-Methylfolate-Algae-B12-B6 (METANX PO) Take by mouth   Yes Historical Provider, MD   lisinopril (PRINIVIL;ZESTRIL) 2.5 MG tablet Take 2.5 mg by mouth daily   Yes Historical Provider, MD   simvastatin (ZOCOR) 20 MG tablet Take 20 mg by mouth nightly   Yes Historical Provider, MD   potassium chloride (KLOR-CON M) 20 MEQ TBCR extended release tablet Take 20 mEq by mouth daily (with breakfast)   Yes Historical Provider, MD   metFORMIN (GLUCOPHAGE) 500 MG tablet Take 500 mg by mouth 2 times daily (with meals)   Yes Historical Provider, MD   SITagliptin (JANUVIA) 50 MG tablet Take 50 mg by mouth daily   Yes Historical Provider, MD   Multiple Vitamins-Minerals (CENTRUM SILVER 50+WOMEN) TABS Take by mouth daily   Yes Historical Provider, MD   Magnesium 500 MG TABS Take 500 mg by mouth daily   Yes Historical Provider, MD   aspirin 81 MG tablet Take 81 mg by mouth daily   Yes Historical Provider, MD   NONFORMULARY Take by mouth daily Neora Vitamin   Yes Historical Provider, MD       Review of Systems   Constitutional: Negative for chills, diaphoresis, fatigue, fever and unexpected weight change. Cardiovascular: Negative for leg swelling. Gastrointestinal: Negative for constipation, diarrhea, nausea and vomiting. Musculoskeletal: Positive for gait problem. Negative for arthralgias and joint swelling. Skin: Negative for color change, pallor, rash and wound. Neurological: Negative for weakness and numbness. Lower Extremity Physical Examination:     Vitals: There were no vitals filed for this visit. General: AAO x 3 in NAD. Integument:   Warm, dry, supple, Bilateral.  Open lesion absent, Bilateral. hyperkeratosis sub heel right, sub 5th left. Feet improved. Dry scaly skin scattered. Vascular: DP and PT pulses palpable 2/4, Bilateral.  CFT <3 seconds, Bilateral.  Hair growth absent to the level of the digits, Bilateral.  Edema absent, Bilateral.  Varicosities absent, Bilateral. Erythema absent, Bilateral    Neurological:  Sensation present to light touch to level of digits, Bilateral.    Musculoskeletal: Muscle strength 5/5, Bilateral.  Right 3rd toe, sitting straight, hyperkeratosis resolved    Asessment: Patient is a 65 y.o. female with:   1. Benign neoplasm of skin of foot, right    2. Hammer toe of right foot    3. Pain in right foot    4. Psoriasis of nail    5. Psoriasis    6. Controlled type 2 diabetes with neuropathy ([facility] [address]    7. Corn of toe    8. Benign neoplasm of skin of foot, left    9. Onychomycosis        Plan: Pt was evaluated and examined. Patient was given personalized discharge instructions. Nails 1-10 were debrided sharply in length and thickness with a nipper and , without incident. Pt will follow up in 4 weeks or sooner if any problems arise. Diagnosis was discussed with the pt and all of their questions were answered in detail. Proper foot hygiene and care was discussed with the pt. Informed patient on proper diabetic foot care and importance of tight glycemic control. Patient to check feet daily and contact the office with any questions/problems/concerns. Other comorbidity noted and will be managed by PCP. Aquaphor daily  Lidex cream    The lesion(s) was partially debrided, enucleated, and silver nitrate was applied with an apperature pad under occlusion. The patient will leave in place for 24-48 hours and than remove. The patient tolerated the procedure well and without complication. No orders of the defined types were placed in this encounter. No orders of the defined types were placed in this encounter.        RTC in 1 months  3/17/2021

## 2021-04-28 RX ORDER — FLUOCINONIDE 0.5 MG/G
OINTMENT TOPICAL
Qty: 60 G | Refills: 2 | Status: SHIPPED | OUTPATIENT
Start: 2021-04-28 | End: 2021-10-18

## 2021-05-11 ENCOUNTER — OFFICE VISIT (OUTPATIENT)
Dept: PODIATRY | Age: 71
End: 2021-05-11
Payer: COMMERCIAL

## 2021-05-11 VITALS — HEIGHT: 65 IN | WEIGHT: 156 LBS | BODY MASS INDEX: 25.99 KG/M2

## 2021-05-11 DIAGNOSIS — D23.72 BENIGN NEOPLASM OF SKIN OF FOOT, LEFT: ICD-10-CM

## 2021-05-11 DIAGNOSIS — L40.9 PSORIASIS OF NAIL: ICD-10-CM

## 2021-05-11 DIAGNOSIS — M79.672 PAIN IN LEFT FOOT: ICD-10-CM

## 2021-05-11 DIAGNOSIS — D23.71 BENIGN NEOPLASM OF SKIN OF FOOT, RIGHT: Primary | ICD-10-CM

## 2021-05-11 DIAGNOSIS — E11.40 CONTROLLED TYPE 2 DIABETES WITH NEUROPATHY (HCC): ICD-10-CM

## 2021-05-11 DIAGNOSIS — B35.1 ONYCHOMYCOSIS: ICD-10-CM

## 2021-05-11 DIAGNOSIS — M79.671 PAIN IN RIGHT FOOT: ICD-10-CM

## 2021-05-11 DIAGNOSIS — M20.41 HAMMER TOE OF RIGHT FOOT: ICD-10-CM

## 2021-05-11 DIAGNOSIS — L40.9 PSORIASIS: ICD-10-CM

## 2021-05-11 DIAGNOSIS — L84 CORN OF TOE: ICD-10-CM

## 2021-05-11 PROCEDURE — 99213 OFFICE O/P EST LOW 20 MIN: CPT | Performed by: PODIATRIST

## 2021-05-11 PROCEDURE — 17110 DESTRUCTION B9 LES UP TO 14: CPT | Performed by: PODIATRIST

## 2021-05-11 RX ORDER — CHLORHEXIDINE GLUCONATE 0.12 MG/ML
RINSE ORAL
COMMUNITY
Start: 2021-04-24 | End: 2022-06-08 | Stop reason: ALTCHOICE

## 2021-05-11 ASSESSMENT — ENCOUNTER SYMPTOMS
NAUSEA: 0
COLOR CHANGE: 0
CONSTIPATION: 0
DIARRHEA: 0
VOMITING: 0

## 2021-05-11 NOTE — PROGRESS NOTES
Provider, MD   metFORMIN (GLUCOPHAGE) 500 MG tablet Take 500 mg by mouth 2 times daily (with meals)   Yes Historical Provider, MD   Multiple Vitamins-Minerals (CENTRUM SILVER 50+WOMEN) TABS Take by mouth daily   Yes Historical Provider, MD   Magnesium 500 MG TABS Take 500 mg by mouth daily   Yes Historical Provider, MD   aspirin 81 MG tablet Take 81 mg by mouth daily   Yes Historical Provider, MD   NONFORMULARY Take by mouth daily Neora Vitamin   Yes Historical Provider, MD       Review of Systems   Constitutional: Negative for chills, diaphoresis, fatigue, fever and unexpected weight change. Cardiovascular: Negative for leg swelling. Gastrointestinal: Negative for constipation, diarrhea, nausea and vomiting. Musculoskeletal: Positive for gait problem. Negative for arthralgias and joint swelling. Skin: Negative for color change, pallor, rash and wound. Neurological: Negative for weakness and numbness. Lower Extremity Physical Examination:     Vitals: There were no vitals filed for this visit. General: AAO x 3 in NAD. Integument:   Warm, dry, supple, Bilateral.  Open lesion absent, Bilateral. hyperkeratosis sub heel right, sub 5th left. Feet improved. Dry scaly skin scattered. Vascular: DP and PT pulses palpable 2/4, Bilateral.  CFT <3 seconds, Bilateral.  Hair growth absent to the level of the digits, Bilateral.  Edema absent, Bilateral.  Varicosities absent, Bilateral. Erythema absent, Bilateral    Neurological:  Sensation present to light touch to level of digits, Bilateral.    Musculoskeletal: Muscle strength 5/5, Bilateral.  Right 3rd toe, sitting straight, hyperkeratosis resolved    Asessment: Patient is a 65 y.o. female with:   1. Benign neoplasm of skin of foot, right    2. Hammer toe of right foot    3. Pain in right foot    4. Psoriasis of nail    5. Psoriasis    6. Controlled type 2 diabetes with neuropathy ([facility] [address]    7. Corn of toe    8. Benign neoplasm of skin of foot, left    9.

## 2021-07-01 ENCOUNTER — OFFICE VISIT (OUTPATIENT)
Dept: PODIATRY | Age: 71
End: 2021-07-01
Payer: COMMERCIAL

## 2021-07-01 VITALS — BODY MASS INDEX: 25.99 KG/M2 | HEIGHT: 65 IN | WEIGHT: 156 LBS

## 2021-07-01 DIAGNOSIS — D23.71 BENIGN NEOPLASM OF SKIN OF FOOT, RIGHT: Primary | ICD-10-CM

## 2021-07-01 DIAGNOSIS — B35.1 ONYCHOMYCOSIS: ICD-10-CM

## 2021-07-01 DIAGNOSIS — E11.40 CONTROLLED TYPE 2 DIABETES WITH NEUROPATHY (HCC): ICD-10-CM

## 2021-07-01 DIAGNOSIS — L40.9 PSORIASIS: ICD-10-CM

## 2021-07-01 DIAGNOSIS — M20.41 HAMMER TOE OF RIGHT FOOT: ICD-10-CM

## 2021-07-01 DIAGNOSIS — M79.672 PAIN IN LEFT FOOT: ICD-10-CM

## 2021-07-01 DIAGNOSIS — L40.9 PSORIASIS OF NAIL: ICD-10-CM

## 2021-07-01 DIAGNOSIS — L84 CORN OF TOE: ICD-10-CM

## 2021-07-01 DIAGNOSIS — M79.671 PAIN IN RIGHT FOOT: ICD-10-CM

## 2021-07-01 DIAGNOSIS — D23.72 BENIGN NEOPLASM OF SKIN OF FOOT, LEFT: ICD-10-CM

## 2021-07-01 PROCEDURE — 99213 OFFICE O/P EST LOW 20 MIN: CPT | Performed by: PODIATRIST

## 2021-07-01 ASSESSMENT — ENCOUNTER SYMPTOMS
VOMITING: 0
COLOR CHANGE: 0
NAUSEA: 0
DIARRHEA: 0
CONSTIPATION: 0

## 2021-07-01 NOTE — PROGRESS NOTES
[facility]  Return Patient     Ann Mondragon 65 y.o. female that presents for follow-up of   Chief Complaint   Patient presents with    Callouses     b/l feet     Diabetes     blood sugar 96     Follow up hyperkeratosis both feet, foot pain, psoriasis. Using Aquaphor and Camea cream. Using lidex ointment. This has helped. Follow up flexor tenotomy of her right 3rd toe. She is doing very well, no pain. Has a new painful callous right heel. Left sub 5th met head. History of hammertoes, right 3rd toe is still painful with a corn. right worse than left, corn on toe, 3rd toe, very painful. Has spacers, toe crests, silicone toe     Currently denies F/C/N/V. Allergies   Allergen Reactions    Latex        Past Medical History:   Diagnosis Date    Arthritis     Bilateral carpal tunnel syndrome     Chronic kidney disease     Diabetes mellitus ([facility] [address]     Diabetic nephropathy (HCC)     Hyperlipemia     Night cramps     Osteopenia     Seizure disorder (HCC)     Tingling in extremities        Prior to Admission medications    Medication Sig Start Date End Date Taking?  Authorizing Provider   chlorhexidine (PERIDEX) 0.12 % solution  4/24/21  Yes Historical Provider, MD   fluocinonide (LIDEX) 0.05 % ointment APPLY TO AFFECTED AREA(S) TWO TIMES A DAY 4/28/21  Yes LAKISHA Townsend M20 20 MEQ extended release tablet  9/10/20  Yes Historical Provider, MD   clobetasol (TEMOVATE) 0.05 % cream  5/22/20  Yes Historical Provider, MD   L-Methylfolate-Algae-B12-B6 (METANX PO) Take by mouth   Yes Historical Provider, MD   lisinopril (PRINIVIL;ZESTRIL) 2.5 MG tablet Take 2.5 mg by mouth daily   Yes Historical Provider, MD   simvastatin (ZOCOR) 20 MG tablet Take 20 mg by mouth nightly   Yes Historical Provider, MD   potassium chloride (KLOR-CON M) 20 MEQ TBCR extended release tablet Take 20 mEq by mouth daily (with breakfast)   Yes Historical Provider, MD   metFORMIN (GLUCOPHAGE) 500 MG tablet Take 500 mg by mouth 2 times daily (with meals)   Yes Historical Provider, MD   Multiple Vitamins-Minerals (CENTRUM SILVER 50+WOMEN) TABS Take by mouth daily   Yes Historical Provider, MD   Magnesium 500 MG TABS Take 500 mg by mouth daily   Yes Historical Provider, MD   aspirin 81 MG tablet Take 81 mg by mouth daily   Yes Historical Provider, MD   NONFORMULARY Take by mouth daily Neora Vitamin   Yes Historical Provider, MD       Review of Systems   Constitutional: Negative for chills, diaphoresis, fatigue, fever and unexpected weight change. Cardiovascular: Negative for leg swelling. Gastrointestinal: Negative for constipation, diarrhea, nausea and vomiting. Musculoskeletal: Positive for gait problem. Negative for arthralgias and joint swelling. Skin: Negative for color change, pallor, rash and wound. Neurological: Negative for weakness and numbness. Lower Extremity Physical Examination:     Vitals: There were no vitals filed for this visit. General: AAO x 3 in NAD. Integument:   Warm, dry, supple, Bilateral.  Open lesion absent, Bilateral. hyperkeratosis sub heel right, sub 5th left. Feet improved. Dry scaly skin scattered. Vascular: DP and PT pulses palpable 2/4, Bilateral.  CFT <3 seconds, Bilateral.  Hair growth absent to the level of the digits, Bilateral.  Edema absent, Bilateral.  Varicosities absent, Bilateral. Erythema absent, Bilateral    Neurological:  Sensation present to light touch to level of digits, Bilateral.    Musculoskeletal: Muscle strength 5/5, Bilateral.  Right 3rd toe, sitting straight, hyperkeratosis resolved    Asessment: Patient is a 65 y.o. female with:   1. Benign neoplasm of skin of foot, right    2. Hammer toe of right foot    3. Pain in right foot    4. Psoriasis of nail    5. Psoriasis    6. Controlled type 2 diabetes with neuropathy ([address]    7. Corn of toe    8. Benign neoplasm of skin of foot, left    9. Onychomycosis    10.  Pain in left foot        Plan: Pt was evaluated and examined. Patient was given personalized discharge instructions. Nails 1-10 were debrided sharply in length and thickness with a nipper and , without incident. Pt will follow up in 4 weeks or sooner if any problems arise. Diagnosis was discussed with the pt and all of their questions were answered in detail. Proper foot hygiene and care was discussed with the pt. Informed patient on proper diabetic foot care and importance of tight glycemic control. Patient to check feet daily and contact the office with any questions/problems/concerns. Other comorbidity noted and will be managed by PCP. Aquaphor daily  Lidex cream    The lesion(s) was partially debrided, enucleated, and silver nitrate was applied with an apperature pad under occlusion. The patient will leave in place for 24-48 hours and than remove. The patient tolerated the procedure well and without complication. No orders of the defined types were placed in this encounter. No orders of the defined types were placed in this encounter.        RTC in 1 months  7/1/2021

## 2021-07-29 ENCOUNTER — OFFICE VISIT (OUTPATIENT)
Dept: PODIATRY | Age: 71
End: 2021-07-29
Payer: COMMERCIAL

## 2021-07-29 VITALS — HEIGHT: 65 IN | BODY MASS INDEX: 26.33 KG/M2 | WEIGHT: 158 LBS

## 2021-07-29 DIAGNOSIS — L84 CORN OF TOE: ICD-10-CM

## 2021-07-29 DIAGNOSIS — D23.71 BENIGN NEOPLASM OF SKIN OF FOOT, RIGHT: Primary | ICD-10-CM

## 2021-07-29 DIAGNOSIS — M79.671 PAIN IN RIGHT FOOT: ICD-10-CM

## 2021-07-29 DIAGNOSIS — M79.672 PAIN IN LEFT FOOT: ICD-10-CM

## 2021-07-29 DIAGNOSIS — B35.1 ONYCHOMYCOSIS: ICD-10-CM

## 2021-07-29 DIAGNOSIS — L40.9 PSORIASIS OF NAIL: ICD-10-CM

## 2021-07-29 DIAGNOSIS — L40.9 PSORIASIS: ICD-10-CM

## 2021-07-29 DIAGNOSIS — M20.41 HAMMER TOE OF RIGHT FOOT: ICD-10-CM

## 2021-07-29 DIAGNOSIS — E11.40 CONTROLLED TYPE 2 DIABETES WITH NEUROPATHY (HCC): ICD-10-CM

## 2021-07-29 DIAGNOSIS — D23.72 BENIGN NEOPLASM OF SKIN OF FOOT, LEFT: ICD-10-CM

## 2021-07-29 PROCEDURE — 99213 OFFICE O/P EST LOW 20 MIN: CPT | Performed by: PODIATRIST

## 2021-07-29 ASSESSMENT — ENCOUNTER SYMPTOMS
VOMITING: 0
COLOR CHANGE: 0
CONSTIPATION: 0
DIARRHEA: 0
NAUSEA: 0

## 2021-07-29 NOTE — PROGRESS NOTES
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 Using Aquaphor and Camea cream. Using lidex ointment. This has helped. Follow up flexor tenotomy of her right 3rd toe. She is doing very well, no pain. Has a new painful callous right heel. Left sub 5th met head. History of hammertoes, right 3rd toe is still painful with a corn. right worse than left, corn on toe, 3rd toe, very painful. Has spacers, toe crests, silicone toe     Currently denies F/C/N/V. Allergies   Allergen Reactions    Latex        Past Medical History:   Diagnosis Date    Arthritis     Bilateral carpal tunnel syndrome     Chronic kidney disease     Diabetes mellitus ([facility] [address]     Diabetic nephropathy (HCC)     Hyperlipemia     Night cramps     Osteopenia     Seizure disorder ([facility])     Tingling in extremities        Prior to Admission medications    Medication Sig Start Date End Date Taking?  Authorizing Provider   chlorhexidine (PERIDEX) 0.12 % solution  4/24/21  Yes Historical Provider, MD   fluocinonide (LIDEX) 0.05 % ointment APPLY TO AFFECTED AREA(S) TWO TIMES A DAY 4/28/21  Yes LAKISHA Aceves M20 20 MEQ extended release tablet  9/10/20  Yes Historical Provider, MD   clobetasol (TEMOVATE) 0.05 % cream  5/22/20  Yes Historical Provider, MD   L-Methylfolate-Algae-B12-B6 (METANX PO) Take by mouth   Yes Historical Provider, MD   lisinopril (PRINIVIL;ZESTRIL) 2.5 MG tablet Take 2.5 mg by mouth daily   Yes Historical Provider, MD   simvastatin (ZOCOR) 20 MG tablet Take 20 mg by mouth nightly   Yes Historical Provider, MD   potassium chloride (KLOR-CON M) 20 MEQ TBCR extended release tablet Take 20 mEq by mouth daily (with breakfast)   Yes Historical Provider, MD   metFORMIN (GLUCOPHAGE) 500 MG tablet Take 500 mg by mouth 2 times daily (with meals)   Yes Historical Provider, MD   Multiple Vitamins-Minerals (CENTRUM SILVER 50+WOMEN) TABS Take by mouth daily   Yes Historical Provider, MD   Magnesium 500 MG TABS Take 500 mg by mouth daily   Yes Historical Provider, MD   aspirin 81 MG tablet Take 81 mg by mouth daily   Yes Historical Provider, MD   NONFORMULARY Take by mouth daily Neora Vitamin   Yes Historical Provider, MD       Review of Systems   Constitutional: Negative for chills, diaphoresis, fatigue, fever and unexpected weight change. Cardiovascular: Negative for leg swelling. Gastrointestinal: Negative for constipation, diarrhea, nausea and vomiting. Musculoskeletal: Positive for gait problem. Negative for arthralgias and joint swelling. Skin: Negative for color change, pallor, rash and wound. Neurological: Negative for weakness and numbness. Lower Extremity Physical Examination:     Vitals: There were no vitals filed for this visit. General: AAO x 3 in NAD. Integument:   Warm, dry, supple, Bilateral.  Open lesion absent, Bilateral. hyperkeratosis sub heel right, sub 5th left. Feet improved. Dry scaly skin scattered. Vascular: DP and PT pulses palpable 2/4, Bilateral.  CFT <3 seconds, Bilateral.  Hair growth absent to the level of the digits, Bilateral.  Edema absent, Bilateral.  Varicosities absent, Bilateral. Erythema absent, Bilateral    Neurological:  Sensation present to light touch to level of digits, Bilateral.    Musculoskeletal: Muscle strength 5/5, Bilateral.  Right 3rd toe, sitting straight, hyperkeratosis resolved    Asessment: Patient is a 65 y.o. female with:   1. Benign neoplasm of skin of foot, right    2. Hammer toe of right foot    3. Pain in right foot    4. Psoriasis of nail    5. Psoriasis    6. Controlled type 2 diabetes with neuropathy ([address]    7. Corn of toe    8.  Benign neoplasm of skin of foot, left    9. Onychomycosis    10. Pain in left foot        Plan: Pt was evaluated and examined. Patient was given personalized discharge instructions. Nails 1-10 were debrided sharply in length and thickness with a nipper and , without incident. Pt will follow up in 4 weeks or sooner if any problems arise. Diagnosis was discussed with the pt and all of their questions were answered in detail. Proper foot hygiene and care was discussed with the pt. Informed patient on proper diabetic foot care and importance of tight glycemic control. Patient to check feet daily and contact the office with any questions/problems/concerns. Other comorbidity noted and will be managed by PCP. Aquaphor daily  Lidex cream    The lesion(s) was partially debrided, enucleated, and silver nitrate was applied with an apperature pad under occlusion. The patient will leave in place for 24-48 hours and than remove. The patient tolerated the procedure well and without complication. No orders of the defined types were placed in this encounter. No orders of the defined types were placed in this encounter.        RTC in 1 months  7/29/2021

## 2021-08-26 ENCOUNTER — OFFICE VISIT (OUTPATIENT)
Dept: PODIATRY | Age: 71
End: 2021-08-26
Payer: COMMERCIAL

## 2021-08-26 VITALS — HEIGHT: 65 IN | BODY MASS INDEX: 26.33 KG/M2 | WEIGHT: 158 LBS

## 2021-08-26 DIAGNOSIS — D23.71 BENIGN NEOPLASM OF SKIN OF FOOT, RIGHT: Primary | ICD-10-CM

## 2021-08-26 DIAGNOSIS — M79.672 PAIN IN LEFT FOOT: ICD-10-CM

## 2021-08-26 DIAGNOSIS — L40.9 PSORIASIS: ICD-10-CM

## 2021-08-26 DIAGNOSIS — L40.9 PSORIASIS OF NAIL: ICD-10-CM

## 2021-08-26 DIAGNOSIS — M20.41 HAMMER TOE OF RIGHT FOOT: ICD-10-CM

## 2021-08-26 DIAGNOSIS — D23.72 BENIGN NEOPLASM OF SKIN OF FOOT, LEFT: ICD-10-CM

## 2021-08-26 DIAGNOSIS — M79.671 PAIN IN RIGHT FOOT: ICD-10-CM

## 2021-08-26 DIAGNOSIS — E11.40 CONTROLLED TYPE 2 DIABETES WITH NEUROPATHY (HCC): ICD-10-CM

## 2021-08-26 DIAGNOSIS — L84 CORN OF TOE: ICD-10-CM

## 2021-08-26 DIAGNOSIS — B35.1 ONYCHOMYCOSIS: ICD-10-CM

## 2021-08-26 PROCEDURE — 99213 OFFICE O/P EST LOW 20 MIN: CPT | Performed by: PODIATRIST

## 2021-08-26 ASSESSMENT — ENCOUNTER SYMPTOMS
NAUSEA: 0
DIARRHEA: 0
CONSTIPATION: 0
COLOR CHANGE: 0
VOMITING: 0

## 2021-08-26 NOTE — PROGRESS NOTES
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 Using Aquaphor and Camea cream. Using lidex ointment. This has helped. Follow up flexor tenotomy of her right 3rd toe. She is doing very well, no pain. Has a new painful callous right heel. Left sub 5th met head. History of hammertoes, right 3rd toe is still painful with a corn. right worse than left, corn on toe, 3rd toe, very painful. Has spacers, toe crests, silicone toe     Currently denies F/C/N/V. Allergies   Allergen Reactions    Latex        Past Medical History:   Diagnosis Date    Arthritis     Bilateral carpal tunnel syndrome     Chronic kidney disease     Diabetes mellitus ([facility] [address]     Diabetic nephropathy (HCC)     Hyperlipemia     Night cramps     Osteopenia     Seizure disorder ([facility])     Tingling in extremities        Prior to Admission medications    Medication Sig Start Date End Date Taking?  Authorizing Provider   chlorhexidine (PERIDEX) 0.12 % solution  4/24/21  Yes Historical Provider, MD   fluocinonide (LIDEX) 0.05 % ointment APPLY TO AFFECTED AREA(S) TWO TIMES A DAY 4/28/21  Yes LAKISHA Aceves M20 20 MEQ extended release tablet  9/10/20  Yes Historical Provider, MD   clobetasol (TEMOVATE) 0.05 % cream  5/22/20  Yes Historical Provider, MD   L-Methylfolate-Algae-B12-B6 (METANX PO) Take by mouth   Yes Historical Provider, MD   lisinopril (PRINIVIL;ZESTRIL) 2.5 MG tablet Take 2.5 mg by mouth daily   Yes Historical Provider, MD   simvastatin (ZOCOR) 20 MG tablet Take 20 mg by mouth nightly   Yes Historical Provider, MD   potassium chloride (KLOR-CON M) 20 MEQ TBCR extended release tablet Take 20 mEq by mouth daily (with breakfast)   Yes Historical Provider, MD   metFORMIN (GLUCOPHAGE) 500 MG tablet Take 500 mg by mouth 2 times daily (with meals)   Yes Historical Provider, MD   Multiple Vitamins-Minerals (CENTRUM SILVER 50+WOMEN) TABS Take by mouth daily   Yes Historical Provider, MD   Magnesium 500 MG TABS Take 500 mg by mouth daily   Yes Historical Provider, MD   aspirin 81 MG tablet Take 81 mg by mouth daily   Yes Historical Provider, MD   NONFORMULARY Take by mouth daily Neora Vitamin   Yes Historical Provider, MD       Review of Systems   Constitutional: Negative for chills, diaphoresis, fatigue, fever and unexpected weight change. Cardiovascular: Negative for leg swelling. Gastrointestinal: Negative for constipation, diarrhea, nausea and vomiting. Musculoskeletal: Positive for gait problem. Negative for arthralgias and joint swelling. Skin: Negative for color change, pallor, rash and wound. Neurological: Negative for weakness and numbness. Lower Extremity Physical Examination:     Vitals: There were no vitals filed for this visit. General: AAO x 3 in NAD. Integument:   Warm, dry, supple, Bilateral.  Open lesion absent, Bilateral. hyperkeratosis sub heel right, sub 5th left. Feet improved. Dry scaly skin scattered. Vascular: DP and PT pulses palpable 2/4, Bilateral.  CFT <3 seconds, Bilateral.  Hair growth absent to the level of the digits, Bilateral.  Edema absent, Bilateral.  Varicosities absent, Bilateral. Erythema absent, Bilateral    Neurological:  Sensation present to light touch to level of digits, Bilateral.    Musculoskeletal: Muscle strength 5/5, Bilateral.  Right 3rd toe, sitting straight, hyperkeratosis resolved    Asessment: Patient is a 65 y.o. female with:   1. Benign neoplasm of skin of foot, right    2. Hammer toe of right foot    3. Pain in right foot    4. Psoriasis of nail    5. Psoriasis    6. Controlled type 2 diabetes with neuropathy ([address]    7. Corn of toe    8. Benign neoplasm of skin of foot, left    9. Onychomycosis    10. Pain in left foot        Plan: Pt was evaluated and examined. Patient was given personalized discharge instructions. Nails 1-10 were debrided sharply in length and thickness with a nipper and , without incident. Pt will follow up in 4 weeks or sooner if any problems arise. Diagnosis was discussed with the pt and all of their questions were answered in detail. Proper foot hygiene and care was discussed with the pt. Informed patient on proper diabetic foot care and importance of tight glycemic control. Patient to check feet daily and contact the office with any questions/problems/concerns. Other comorbidity noted and will be managed by PCP. Aquaphor daily  Lidex cream    The lesion(s) was partially debrided, enucleated, and silver nitrate was applied with an apperature pad under occlusion. The patient will leave in place for 24-48 hours and than remove. The patient tolerated the procedure well and without complication. No orders of the defined types were placed in this encounter. No orders of the defined types were placed in this encounter.        RTC in 1 months  8/26/2021

## 2021-09-23 ENCOUNTER — OFFICE VISIT (OUTPATIENT)
Dept: PODIATRY | Age: 71
End: 2021-09-23
Payer: COMMERCIAL

## 2021-09-23 VITALS — HEIGHT: 65 IN | BODY MASS INDEX: 26.29 KG/M2

## 2021-09-23 DIAGNOSIS — M20.41 HAMMER TOE OF RIGHT FOOT: ICD-10-CM

## 2021-09-23 DIAGNOSIS — L40.9 PSORIASIS: ICD-10-CM

## 2021-09-23 DIAGNOSIS — D23.72 BENIGN NEOPLASM OF SKIN OF FOOT, LEFT: ICD-10-CM

## 2021-09-23 DIAGNOSIS — L40.9 PSORIASIS OF NAIL: ICD-10-CM

## 2021-09-23 DIAGNOSIS — L84 CORN OF TOE: ICD-10-CM

## 2021-09-23 DIAGNOSIS — B35.1 ONYCHOMYCOSIS: ICD-10-CM

## 2021-09-23 DIAGNOSIS — D23.71 BENIGN NEOPLASM OF SKIN OF FOOT, RIGHT: Primary | ICD-10-CM

## 2021-09-23 DIAGNOSIS — M79.671 PAIN IN RIGHT FOOT: ICD-10-CM

## 2021-09-23 DIAGNOSIS — E11.40 CONTROLLED TYPE 2 DIABETES WITH NEUROPATHY (HCC): ICD-10-CM

## 2021-09-23 DIAGNOSIS — M79.672 PAIN IN LEFT FOOT: ICD-10-CM

## 2021-09-23 PROCEDURE — 99213 OFFICE O/P EST LOW 20 MIN: CPT | Performed by: PODIATRIST

## 2021-09-23 ASSESSMENT — ENCOUNTER SYMPTOMS
VOMITING: 0
CONSTIPATION: 0
COLOR CHANGE: 0
DIARRHEA: 0
NAUSEA: 0

## 2021-09-23 NOTE — PROGRESS NOTES
2 times daily (with meals)   Yes Historical Provider, MD   Multiple Vitamins-Minerals (CENTRUM SILVER 50+WOMEN) TABS Take by mouth daily   Yes Historical Provider, MD   Magnesium 500 MG TABS Take 500 mg by mouth daily   Yes Historical Provider, MD   aspirin 81 MG tablet Take 81 mg by mouth daily   Yes Historical Provider, MD   NONFORMULARY Take by mouth daily Neora Vitamin   Yes Historical Provider, MD       Review of Systems   Constitutional: Negative for chills, diaphoresis, fatigue, fever and unexpected weight change. Cardiovascular: Negative for leg swelling. Gastrointestinal: Negative for constipation, diarrhea, nausea and vomiting. Musculoskeletal: Positive for gait problem. Negative for arthralgias and joint swelling. Skin: Negative for color change, pallor, rash and wound. Neurological: Negative for weakness and numbness. Lower Extremity Physical Examination:     Vitals: There were no vitals filed for this visit. General: AAO x 3 in NAD. Integument:   Warm, dry, supple, Bilateral.  Open lesion absent, Bilateral. hyperkeratosis sub heel right, sub 5th left. Feet improved. Dry scaly skin scattered. Vascular: DP and PT pulses palpable 2/4, Bilateral.  CFT <3 seconds, Bilateral.  Hair growth absent to the level of the digits, Bilateral.  Edema absent, Bilateral.  Varicosities absent, Bilateral. Erythema absent, Bilateral    Neurological:  Sensation present to light touch to level of digits, Bilateral.    Musculoskeletal: Muscle strength 5/5, Bilateral.  Right 3rd toe, sitting straight, hyperkeratosis resolved    Asessment: Patient is a 66 y.o. female with:   1. Benign neoplasm of skin of foot, right    2. Hammer toe of right foot    3. Pain in right foot    4. Psoriasis of nail    5. Psoriasis    6. Controlled type 2 diabetes with neuropathy ([address]    7. Corn of toe    8. Benign neoplasm of skin of foot, left    9. Onychomycosis    10.  Pain in left foot        Plan: Pt was evaluated and examined. Patient was given personalized discharge instructions. Nails 1-10 were debrided sharply in length and thickness with a nipper and , without incident. Pt will follow up in 4 weeks or sooner if any problems arise. Diagnosis was discussed with the pt and all of their questions were answered in detail. Proper foot hygiene and care was discussed with the pt. Informed patient on proper diabetic foot care and importance of tight glycemic control. Patient to check feet daily and contact the office with any questions/problems/concerns. Other comorbidity noted and will be managed by PCP. Aquaphor daily  Lidex cream    The lesion(s) was partially debrided, enucleated, and silver nitrate was applied with an apperature pad under occlusion. The patient will leave in place for 24-48 hours and than remove. The patient tolerated the procedure well and without complication. No orders of the defined types were placed in this encounter. No orders of the defined types were placed in this encounter.        RTC in 1 months  9/23/2021

## 2021-10-18 RX ORDER — FLUOCINONIDE 0.5 MG/G
OINTMENT TOPICAL
Qty: 60 G | Refills: 5 | Status: SHIPPED | OUTPATIENT
Start: 2021-10-18

## 2021-10-18 NOTE — TELEPHONE ENCOUNTER
Please Approve or Refuse.        Next Visit Date:  10/21/2021   Last Visit Date: 9/23/2021    No results found for: LABA1C          ( goal A1C is < 7)   BP Readings from Last 3 Encounters:   01/14/20 116/60   11/12/19 104/60          (goal 120/80)  No results found for: BUN  No results found for: CREATININE  No results found for: K

## 2021-10-21 ENCOUNTER — OFFICE VISIT (OUTPATIENT)
Dept: PODIATRY | Age: 71
End: 2021-10-21
Payer: COMMERCIAL

## 2021-10-21 VITALS — WEIGHT: 158 LBS | HEIGHT: 65 IN | BODY MASS INDEX: 26.33 KG/M2

## 2021-10-21 DIAGNOSIS — B35.1 ONYCHOMYCOSIS: ICD-10-CM

## 2021-10-21 DIAGNOSIS — M79.671 PAIN IN RIGHT FOOT: ICD-10-CM

## 2021-10-21 DIAGNOSIS — D23.72 BENIGN NEOPLASM OF SKIN OF FOOT, LEFT: ICD-10-CM

## 2021-10-21 DIAGNOSIS — L40.9 PSORIASIS: ICD-10-CM

## 2021-10-21 DIAGNOSIS — E11.40 CONTROLLED TYPE 2 DIABETES WITH NEUROPATHY (HCC): ICD-10-CM

## 2021-10-21 DIAGNOSIS — M20.41 HAMMER TOE OF RIGHT FOOT: ICD-10-CM

## 2021-10-21 DIAGNOSIS — M79.672 PAIN IN LEFT FOOT: ICD-10-CM

## 2021-10-21 DIAGNOSIS — D23.71 BENIGN NEOPLASM OF SKIN OF FOOT, RIGHT: Primary | ICD-10-CM

## 2021-10-21 DIAGNOSIS — L40.9 PSORIASIS OF NAIL: ICD-10-CM

## 2021-10-21 PROCEDURE — 99213 OFFICE O/P EST LOW 20 MIN: CPT | Performed by: PODIATRIST

## 2021-10-21 ASSESSMENT — ENCOUNTER SYMPTOMS
VOMITING: 0
DIARRHEA: 0
NAUSEA: 0
COLOR CHANGE: 0
CONSTIPATION: 0

## 2021-10-21 NOTE — PROGRESS NOTES
[facility]  Return Patient     Lashon Leahy 66 y.o. female that presents for follow-up of   Chief Complaint   Patient presents with    Callouses     b/l      Follow up hyperkeratosis. dyshydrosis  both feet, foot pain, psoriasis. Using Aquaphor and Camea cream. Using lidex ointment. This has helped. Follow up flexor tenotomy of her right 3rd toe. She is doing very well, no pain. Has a new painful callous right heel. Left sub 5th met head. History of hammertoes, right 3rd toe is still painful with a corn. right worse than left, corn on toe, 3rd toe, very painful. Has spacers, toe crests, silicone toe     Currently denies F/C/N/V. Allergies   Allergen Reactions    Latex        Past Medical History:   Diagnosis Date    Arthritis     Bilateral carpal tunnel syndrome     Chronic kidney disease     [facility] mellitus [facility] [address]     Diabetic nephropathy (HCC)     Hyperlipemia     Night cramps     Osteopenia     Seizure disorder (HCC)     Tingling in extremities        Prior to Admission medications    Medication Sig Start Date End Date Taking?  Authorizing Provider   fluocinonide (LIDEX) 0.05 % ointment APPLY TO AFFECTED AREA TWO TIMES A DAY 10/18/21  Yes Charlotte Sbaa, DPKAYLEY   chlorhexidine (PERIDEX) 0.12 % solution  4/24/21  Yes Historical Provider, MD   KLOR-CON M20 20 MEQ extended release tablet  9/10/20  Yes Historical Provider, MD   clobetasol (TEMOVATE) 0.05 % cream  5/22/20  Yes Historical Provider, MD   L-Methylfolate-Algae-B12-B6 (METANX PO) Take by mouth   Yes Historical Provider, MD   lisinopril (PRINIVIL;ZESTRIL) 2.5 MG tablet Take 2.5 mg by mouth daily   Yes Historical Provider, MD   simvastatin (ZOCOR) 20 MG tablet Take 20 mg by mouth nightly   Yes Historical Provider, MD   potassium chloride (KLOR-CON M) 20 MEQ TBCR extended release tablet Take 20 mEq by mouth daily (with breakfast)   Yes Historical Provider, MD   metFORMIN (GLUCOPHAGE) 500 MG tablet Take 500 mg by mouth 2 times daily (with meals)   Yes Historical Provider, MD   Multiple Vitamins-Minerals (CENTRUM SILVER 50+WOMEN) TABS Take by mouth daily   Yes Historical Provider, MD   Magnesium 500 MG TABS Take 500 mg by mouth daily   Yes Historical Provider, MD   aspirin 81 MG tablet Take 81 mg by mouth daily   Yes Historical Provider, MD   NONFORMULARY Take by mouth daily Neora Vitamin   Yes Historical Provider, MD       Review of Systems   Constitutional: Negative for chills, diaphoresis, fatigue, fever and unexpected weight change. Cardiovascular: Negative for leg swelling. Gastrointestinal: Negative for constipation, diarrhea, nausea and vomiting. Musculoskeletal: Positive for gait problem. Negative for arthralgias and joint swelling. Skin: Negative for color change, pallor, rash and wound. Neurological: Negative for weakness and numbness. Lower Extremity Physical Examination:     Vitals: There were no vitals filed for this visit. General: AAO x 3 in NAD. Integument:   Warm, dry, supple, Bilateral.  Open lesion absent, Bilateral. hyperkeratosis sub heel right, sub 5th left. Feet improved. Dry scaly skin scattered. Vascular: DP and PT pulses palpable 2/4, Bilateral.  CFT <3 seconds, Bilateral.  Hair growth absent to the level of the digits, Bilateral.  Edema absent, Bilateral.  Varicosities absent, Bilateral. Erythema absent, Bilateral    Neurological:  Sensation present to light touch to level of digits, Bilateral.    Musculoskeletal: Muscle strength 5/5, Bilateral.  Right 3rd toe, sitting straight, hyperkeratosis resolved    Asessment: Patient is a 66 y.o. female with:   1. Benign neoplasm of skin of foot, right    2. Hammer toe of right foot    3. Pain in right foot    4. Psoriasis of nail    5. Psoriasis    6. Controlled type 2 diabetes with neuropathy ([address]    7. Benign neoplasm of skin of foot, left    8. Pain in left foot    9. Onychomycosis        Plan: Pt was evaluated and examined.  Patient was given personalized discharge instructions. Nails 1-10 were debrided sharply in length and thickness with a nipper and , without incident. Pt will follow up in 4 weeks or sooner if any problems arise. Diagnosis was discussed with the pt and all of their questions were answered in detail. Proper foot hygiene and care was discussed with the pt. Informed patient on proper diabetic foot care and importance of tight glycemic control. Patient to check feet daily and contact the office with any questions/problems/concerns. Other comorbidity noted and will be managed by PCP. Aquaphor daily  Lidex cream    The lesion(s) was partially debrided, enucleated, and silver nitrate was applied with an apperature pad under occlusion. The patient will leave in place for 24-48 hours and than remove. The patient tolerated the procedure well and without complication. No orders of the defined types were placed in this encounter. No orders of the defined types were placed in this encounter.        RTC in 1 months  10/21/2021

## 2021-11-18 ENCOUNTER — OFFICE VISIT (OUTPATIENT)
Dept: PODIATRY | Age: 71
End: 2021-11-18
Payer: COMMERCIAL

## 2021-11-18 VITALS — WEIGHT: 158 LBS | BODY MASS INDEX: 26.33 KG/M2 | HEIGHT: 65 IN

## 2021-11-18 DIAGNOSIS — L40.9 PSORIASIS: ICD-10-CM

## 2021-11-18 DIAGNOSIS — L40.9 PSORIASIS OF NAIL: ICD-10-CM

## 2021-11-18 DIAGNOSIS — E11.40 CONTROLLED TYPE 2 DIABETES WITH NEUROPATHY (HCC): ICD-10-CM

## 2021-11-18 DIAGNOSIS — M79.671 PAIN IN RIGHT FOOT: ICD-10-CM

## 2021-11-18 DIAGNOSIS — L84 CORN OF TOE: ICD-10-CM

## 2021-11-18 DIAGNOSIS — B35.1 ONYCHOMYCOSIS: ICD-10-CM

## 2021-11-18 DIAGNOSIS — M20.41 HAMMER TOE OF RIGHT FOOT: ICD-10-CM

## 2021-11-18 DIAGNOSIS — M79.672 PAIN IN LEFT FOOT: ICD-10-CM

## 2021-11-18 DIAGNOSIS — D23.71 BENIGN NEOPLASM OF SKIN OF FOOT, RIGHT: Primary | ICD-10-CM

## 2021-11-18 DIAGNOSIS — D23.72 BENIGN NEOPLASM OF SKIN OF FOOT, LEFT: ICD-10-CM

## 2021-11-18 PROCEDURE — 99213 OFFICE O/P EST LOW 20 MIN: CPT | Performed by: PODIATRIST

## 2021-11-18 ASSESSMENT — ENCOUNTER SYMPTOMS
COLOR CHANGE: 0
DIARRHEA: 0
NAUSEA: 0
VOMITING: 0
CONSTIPATION: 0

## 2021-11-18 NOTE — PROGRESS NOTES
[facility]  Return Patient     Marco A Delgado 66 y.o. female that presents for follow-up of   Chief Complaint   Patient presents with    Callouses     callous trim b/l      Follow up hyperkeratosis. dyshydrosis  both feet, foot pain, psoriasis. Using Aquaphor and Camea cream. Using lidex ointment. This has helped. Follow up flexor tenotomy of her right 3rd toe. She is doing very well, no pain. Has a new painful callous right heel. Left sub 5th met head. History of hammertoes, right 3rd toe is still painful with a corn. right worse than left, corn on toe, 3rd toe, very painful. Has spacers, toe crests, silicone toe     Currently denies F/C/N/V. Allergies   Allergen Reactions    Latex        Past Medical History:   Diagnosis Date    Arthritis     Bilateral carpal tunnel syndrome     Chronic kidney disease     Diabetes mellitus ([facility] [address]     Diabetic nephropathy (HCC)     Hyperlipemia     Night cramps     Osteopenia     Seizure disorder (HCC)     Tingling in extremities        Prior to Admission medications    Medication Sig Start Date End Date Taking?  Authorizing Provider   fluocinonide (LIDEX) 0.05 % ointment APPLY TO AFFECTED AREA TWO TIMES A DAY 10/18/21  Yes Mc Call DPM   chlorhexidine (PERIDEX) 0.12 % solution  4/24/21  Yes Historical Provider, MD   KLOR-CON M20 20 MEQ extended release tablet  9/10/20  Yes Historical Provider, MD   clobetasol (TEMOVATE) 0.05 % cream  5/22/20  Yes Historical Provider, MD   L-Methylfolate-Algae-B12-B6 (METANX PO) Take by mouth   Yes Historical Provider, MD   lisinopril (PRINIVIL;ZESTRIL) 2.5 MG tablet Take 2.5 mg by mouth daily   Yes Historical Provider, MD   simvastatin (ZOCOR) 20 MG tablet Take 20 mg by mouth nightly   Yes Historical Provider, MD   potassium chloride (KLOR-CON M) 20 MEQ TBCR extended release tablet Take 20 mEq by mouth daily (with breakfast)   Yes Historical Provider, MD   metFORMIN (GLUCOPHAGE) 500 MG tablet Take 500 mg by mouth 2 times daily (with meals)   Yes Historical Provider, MD   Multiple Vitamins-Minerals (CENTRUM SILVER 50+WOMEN) TABS Take by mouth daily   Yes Historical Provider, MD   Magnesium 500 MG TABS Take 500 mg by mouth daily   Yes Historical Provider, MD   aspirin 81 MG tablet Take 81 mg by mouth daily   Yes Historical Provider, MD   NONFORMULARY Take by mouth daily Neora Vitamin   Yes Historical Provider, MD       Review of Systems   Constitutional: Negative for chills, diaphoresis, fatigue, fever and unexpected weight change. Cardiovascular: Negative for leg swelling. Gastrointestinal: Negative for constipation, diarrhea, nausea and vomiting. Musculoskeletal: Positive for gait problem. Negative for arthralgias and joint swelling. Skin: Negative for color change, pallor, rash and wound. Neurological: Negative for weakness and numbness. Lower Extremity Physical Examination:     Vitals: There were no vitals filed for this visit. General: AAO x 3 in NAD. Integument:   Warm, dry, supple, Bilateral.  Open lesion absent, Bilateral. hyperkeratosis sub heel right, sub 5th left. Feet improved. Dry scaly skin scattered. Vascular: DP and PT pulses palpable 2/4, Bilateral.  CFT <3 seconds, Bilateral.  Hair growth absent to the level of the digits, Bilateral.  Edema absent, Bilateral.  Varicosities absent, Bilateral. Erythema absent, Bilateral    Neurological:  Sensation present to light touch to level of digits, Bilateral.    Musculoskeletal: Muscle strength 5/5, Bilateral.  Right 3rd toe, sitting straight, hyperkeratosis resolved    Asessment: Patient is a 66 y.o. female with:   1. Benign neoplasm of skin of foot, right    2. Hammer toe of right foot    3. Pain in right foot    4. Psoriasis of nail    5. Controlled type 2 diabetes with neuropathy ([address]    6. Benign neoplasm of skin of foot, left    7. Pain in left foot    8. Onychomycosis    9. Corn of toe    10.  Psoriasis        Plan: Pt was evaluated and examined. Patient was given personalized discharge instructions. Nails 1-10 were debrided sharply in length and thickness with a nipper and , without incident. Pt will follow up in 4 weeks or sooner if any problems arise. Diagnosis was discussed with the pt and all of their questions were answered in detail. Proper foot hygiene and care was discussed with the pt. Informed patient on proper diabetic foot care and importance of tight glycemic control. Patient to check feet daily and contact the office with any questions/problems/concerns. Other comorbidity noted and will be managed by PCP. Aquaphor daily  Lidex cream    The lesion(s) was partially debrided, enucleated, and silver nitrate was applied with an apperature pad under occlusion. The patient will leave in place for 24-48 hours and than remove. The patient tolerated the procedure well and without complication. No orders of the defined types were placed in this encounter. No orders of the defined types were placed in this encounter.        RTC in 1 months  11/18/2021

## 2021-12-16 ENCOUNTER — OFFICE VISIT (OUTPATIENT)
Dept: PODIATRY | Age: 71
End: 2021-12-16
Payer: COMMERCIAL

## 2021-12-16 VITALS — HEIGHT: 65 IN | WEIGHT: 158 LBS | BODY MASS INDEX: 26.33 KG/M2

## 2021-12-16 DIAGNOSIS — L40.9 PSORIASIS OF NAIL: ICD-10-CM

## 2021-12-16 DIAGNOSIS — D23.71 BENIGN NEOPLASM OF SKIN OF FOOT, RIGHT: Primary | ICD-10-CM

## 2021-12-16 DIAGNOSIS — B35.1 ONYCHOMYCOSIS: ICD-10-CM

## 2021-12-16 DIAGNOSIS — M79.672 PAIN IN LEFT FOOT: ICD-10-CM

## 2021-12-16 DIAGNOSIS — L84 CORN OF TOE: ICD-10-CM

## 2021-12-16 DIAGNOSIS — L40.9 PSORIASIS: ICD-10-CM

## 2021-12-16 DIAGNOSIS — M20.41 HAMMER TOE OF RIGHT FOOT: ICD-10-CM

## 2021-12-16 DIAGNOSIS — E11.40 CONTROLLED TYPE 2 DIABETES WITH NEUROPATHY (HCC): ICD-10-CM

## 2021-12-16 DIAGNOSIS — D23.72 BENIGN NEOPLASM OF SKIN OF FOOT, LEFT: ICD-10-CM

## 2021-12-16 DIAGNOSIS — M79.671 PAIN IN RIGHT FOOT: ICD-10-CM

## 2021-12-16 PROCEDURE — 99213 OFFICE O/P EST LOW 20 MIN: CPT | Performed by: PODIATRIST

## 2021-12-16 ASSESSMENT — ENCOUNTER SYMPTOMS
NAUSEA: 0
DIARRHEA: 0
COLOR CHANGE: 0
CONSTIPATION: 0
VOMITING: 0

## 2021-12-16 NOTE — PROGRESS NOTES
[facility]  Return Patient     Stewart Sykes 66 y.o. female that presents for follow-up of   Chief Complaint   Patient presents with    Callouses     callous trim b/l      Follow up hyperkeratosis. dyshydrosis  both feet, foot pain, psoriasis. Using Aquaphor and Camea cream. Using lidex ointment. This has helped. Follow up flexor tenotomy of her right 3rd toe. She is doing very well, no pain. Has a new painful callous right heel. Left sub 5th met head. History of hammertoes, right 3rd toe is still painful with a corn. right worse than left, corn on toe, 3rd toe, very painful. Has spacers, toe crests, silicone toe     Currently denies F/C/N/V. Allergies   Allergen Reactions    Latex        Past Medical History:   Diagnosis Date    Arthritis     Bilateral carpal tunnel syndrome     Chronic kidney disease     Diabetes mellitus ([facility] [address]     Diabetic nephropathy (HCC)     Hyperlipemia     Night cramps     Osteopenia     Seizure disorder (HCC)     Tingling in extremities        Prior to Admission medications    Medication Sig Start Date End Date Taking?  Authorizing Provider   fluocinonide (LIDEX) 0.05 % ointment APPLY TO AFFECTED AREA TWO TIMES A DAY 10/18/21  Yes Jono Hayes DPM   chlorhexidine (PERIDEX) 0.12 % solution  4/24/21  Yes Historical Provider, MD   KLOR-CON M20 20 MEQ extended release tablet  9/10/20  Yes Historical Provider, MD   clobetasol (TEMOVATE) 0.05 % cream  5/22/20  Yes Historical Provider, MD   L-Methylfolate-Algae-B12-B6 (METANX PO) Take by mouth   Yes Historical Provider, MD   lisinopril (PRINIVIL;ZESTRIL) 2.5 MG tablet Take 2.5 mg by mouth daily   Yes Historical Provider, MD   simvastatin (ZOCOR) 20 MG tablet Take 20 mg by mouth nightly   Yes Historical Provider, MD   potassium chloride (KLOR-CON M) 20 MEQ TBCR extended release tablet Take 20 mEq by mouth daily (with breakfast)   Yes Historical Provider, MD   metFORMIN (GLUCOPHAGE) 500 MG tablet Take 500 mg by mouth 2 times daily (with meals)   Yes Historical Provider, MD   Multiple Vitamins-Minerals (CENTRUM SILVER 50+WOMEN) TABS Take by mouth daily   Yes Historical Provider, MD   Magnesium 500 MG TABS Take 500 mg by mouth daily   Yes Historical Provider, MD   aspirin 81 MG tablet Take 81 mg by mouth daily   Yes Historical Provider, MD   NONFORMULARY Take by mouth daily Neora Vitamin   Yes Historical Provider, MD       Review of Systems   Constitutional: Negative for chills, diaphoresis, fatigue, fever and unexpected weight change. Cardiovascular: Negative for leg swelling. Gastrointestinal: Negative for constipation, diarrhea, nausea and vomiting. Musculoskeletal: Positive for gait problem. Negative for arthralgias and joint swelling. Skin: Negative for color change, pallor, rash and wound. Neurological: Negative for weakness and numbness. Lower Extremity Physical Examination:     Vitals: There were no vitals filed for this visit. General: AAO x 3 in NAD. Integument:   Warm, dry, supple, Bilateral.  Open lesion absent, Bilateral. hyperkeratosis sub heel right, sub 5th left. Feet improved. Dry scaly skin scattered. Vascular: DP and PT pulses palpable 2/4, Bilateral.  CFT <3 seconds, Bilateral.  Hair growth absent to the level of the digits, Bilateral.  Edema absent, Bilateral.  Varicosities absent, Bilateral. Erythema absent, Bilateral    Neurological:  Sensation present to light touch to level of digits, Bilateral.    Musculoskeletal: Muscle strength 5/5, Bilateral.  Right 3rd toe, sitting straight, hyperkeratosis resolved    Asessment: Patient is a 66 y.o. female with:   1. Benign neoplasm of skin of foot, right    2. Hammer toe of right foot    3. Psoriasis of nail    4. Pain in right foot    5. Controlled type 2 diabetes with neuropathy ([address]    6. Benign neoplasm of skin of foot, left    7. Pain in left foot    8. Onychomycosis    9. Psoriasis    10.  Corn of toe        Plan: Pt was evaluated and examined. Patient was given personalized discharge instructions. Nails 1-10 were debrided sharply in length and thickness with a nipper and , without incident. Pt will follow up in 4 weeks or sooner if any problems arise. Diagnosis was discussed with the pt and all of their questions were answered in detail. Proper foot hygiene and care was discussed with the pt. Informed patient on proper diabetic foot care and importance of tight glycemic control. Patient to check feet daily and contact the office with any questions/problems/concerns. Other comorbidity noted and will be managed by PCP. Aquaphor daily  Lidex cream    The lesion(s) was partially debrided, enucleated, and silver nitrate was applied with an apperature pad under occlusion. The patient will leave in place for 24-48 hours and than remove. The patient tolerated the procedure well and without complication. No orders of the defined types were placed in this encounter. No orders of the defined types were placed in this encounter.        RTC in 1 months  12/16/2021

## 2022-02-09 ENCOUNTER — OFFICE VISIT (OUTPATIENT)
Dept: PODIATRY | Age: 72
End: 2022-02-09
Payer: COMMERCIAL

## 2022-02-09 VITALS — WEIGHT: 158 LBS | HEIGHT: 65 IN | BODY MASS INDEX: 26.33 KG/M2

## 2022-02-09 DIAGNOSIS — L40.9 PSORIASIS OF NAIL: ICD-10-CM

## 2022-02-09 DIAGNOSIS — M20.41 HAMMER TOE OF RIGHT FOOT: ICD-10-CM

## 2022-02-09 DIAGNOSIS — D23.72 BENIGN NEOPLASM OF SKIN OF FOOT, LEFT: ICD-10-CM

## 2022-02-09 DIAGNOSIS — L84 CORN OF TOE: ICD-10-CM

## 2022-02-09 DIAGNOSIS — B35.1 ONYCHOMYCOSIS: ICD-10-CM

## 2022-02-09 DIAGNOSIS — E11.40 CONTROLLED TYPE 2 DIABETES WITH NEUROPATHY (HCC): ICD-10-CM

## 2022-02-09 DIAGNOSIS — L40.9 PSORIASIS: ICD-10-CM

## 2022-02-09 DIAGNOSIS — D23.71 BENIGN NEOPLASM OF SKIN OF FOOT, RIGHT: Primary | ICD-10-CM

## 2022-02-09 DIAGNOSIS — M79.672 PAIN IN LEFT FOOT: ICD-10-CM

## 2022-02-09 DIAGNOSIS — M79.671 PAIN IN RIGHT FOOT: ICD-10-CM

## 2022-02-09 PROCEDURE — 99214 OFFICE O/P EST MOD 30 MIN: CPT | Performed by: PODIATRIST

## 2022-02-09 ASSESSMENT — ENCOUNTER SYMPTOMS
COLOR CHANGE: 0
NAUSEA: 0
CONSTIPATION: 0
VOMITING: 0
DIARRHEA: 0

## 2022-02-09 NOTE — PROGRESS NOTES
[facility]  Return Patient     Bethany Right 66 y.o. female that presents for follow-up of   Chief Complaint   Patient presents with    Callouses     b/l, last seen Milan Jarrett MD 11/4/21     Follow up hyperkeratosis. dyshydrosis  both feet, foot pain, psoriasis. Using Aquaphor and Camea cream. Using lidex ointment. This has helped. Follow up flexor tenotomy of her right 3rd toe. She is doing very well, no pain. Has a new painful callous right heel. Left sub 5th met head. History of hammertoes, right 3rd toe is still painful with a corn. right worse than left, corn on toe, 3rd toe, very painful. Has spacers, toe crests, silicone toe     Currently denies F/C/N/V. Allergies   Allergen Reactions    Latex        Past Medical History:   Diagnosis Date    Arthritis     Bilateral carpal tunnel syndrome     Chronic kidney disease     Diabetes mellitus ([facility] [address]     Diabetic nephropathy (HCC)     Hyperlipemia     Night cramps     Osteopenia     Seizure disorder (HCC)     Tingling in extremities        Prior to Admission medications    Medication Sig Start Date End Date Taking?  Authorizing Provider   fluocinonide (LIDEX) 0.05 % ointment APPLY TO AFFECTED AREA TWO TIMES A DAY 10/18/21  Yes Jenny King DPM   chlorhexidine (PERIDEX) 0.12 % solution  4/24/21  Yes Historical Provider, MD   KLOR-CON M20 20 MEQ extended release tablet  9/10/20  Yes Historical Provider, MD   clobetasol (TEMOVATE) 0.05 % cream  5/22/20  Yes Historical Provider, MD   L-Methylfolate-Algae-B12-B6 (METANX PO) Take by mouth   Yes Historical Provider, MD   lisinopril (PRINIVIL;ZESTRIL) 2.5 MG tablet Take 2.5 mg by mouth daily   Yes Historical Provider, MD   simvastatin (ZOCOR) 20 MG tablet Take 20 mg by mouth nightly   Yes Historical Provider, MD   potassium chloride (KLOR-CON M) 20 MEQ TBCR extended release tablet Take 20 mEq by mouth daily (with breakfast)   Yes Historical Provider, MD   metFORMIN (GLUCOPHAGE) 500 MG tablet Take 500 mg by mouth 2 times daily (with meals)   Yes Historical Provider, MD   Multiple Vitamins-Minerals (CENTRUM SILVER 50+WOMEN) TABS Take by mouth daily   Yes Historical Provider, MD   Magnesium 500 MG TABS Take 500 mg by mouth daily   Yes Historical Provider, MD   aspirin 81 MG tablet Take 81 mg by mouth daily   Yes Historical Provider, MD   NONFORMULARY Take by mouth daily Neora Vitamin   Yes Historical Provider, MD       Review of Systems   Constitutional: Negative for chills, diaphoresis, fatigue, fever and unexpected weight change. Cardiovascular: Negative for leg swelling. Gastrointestinal: Negative for constipation, diarrhea, nausea and vomiting. Musculoskeletal: Positive for gait problem. Negative for arthralgias and joint swelling. Skin: Negative for color change, pallor, rash and wound. Neurological: Negative for weakness and numbness. Lower Extremity Physical Examination:     Vitals: There were no vitals filed for this visit. General: AAO x 3 in NAD. Integument:   Warm, dry, supple, Bilateral.  Open lesion absent, Bilateral. hyperkeratosis sub heel right, sub 5th left. Feet improved. Dry scaly skin scattered. Vascular: DP and PT pulses palpable 2/4, Bilateral.  CFT <3 seconds, Bilateral.  Hair growth absent to the level of the digits, Bilateral.  Edema absent, Bilateral.  Varicosities absent, Bilateral. Erythema absent, Bilateral    Neurological:  Sensation present to light touch to level of digits, Bilateral.    Musculoskeletal: Muscle strength 5/5, Bilateral.  Right 3rd toe, sitting straight, hyperkeratosis resolved    Asessment: Patient is a 66 y.o. female with:   1. Benign neoplasm of skin of foot, right    2. Hammer toe of right foot    3. Psoriasis of nail    4. Pain in right foot    5. Controlled type 2 diabetes with neuropathy ([address]    6. Benign neoplasm of skin of foot, left    7. Pain in left foot    8. Onychomycosis    9. Psoriasis    10.  Corn of toe Plan: Pt was evaluated and examined. Patient was given personalized discharge instructions. Nails 1-10 were debrided sharply in length and thickness with a nipper and , without incident. Pt will follow up in 4 weeks or sooner if any problems arise. Diagnosis was discussed with the pt and all of their questions were answered in detail. Proper foot hygiene and care was discussed with the pt. Informed patient on proper diabetic foot care and importance of tight glycemic control. Patient to check feet daily and contact the office with any questions/problems/concerns. Other comorbidity noted and will be managed by PCP. Aquaphor daily  Lidex cream    The lesion(s) was partially debrided, enucleated, and silver nitrate was applied with an apperature pad under occlusion. The patient will leave in place for 24-48 hours and than remove. The patient tolerated the procedure well and without complication. No orders of the defined types were placed in this encounter. No orders of the defined types were placed in this encounter.        RTC in 1 months  2/9/2022

## 2022-03-16 ENCOUNTER — OFFICE VISIT (OUTPATIENT)
Dept: PODIATRY | Age: 72
End: 2022-03-16
Payer: COMMERCIAL

## 2022-03-16 VITALS — WEIGHT: 158 LBS | BODY MASS INDEX: 26.33 KG/M2 | HEIGHT: 65 IN

## 2022-03-16 DIAGNOSIS — M79.671 PAIN IN RIGHT FOOT: ICD-10-CM

## 2022-03-16 DIAGNOSIS — L40.9 PSORIASIS OF NAIL: ICD-10-CM

## 2022-03-16 DIAGNOSIS — L84 CORN OF TOE: ICD-10-CM

## 2022-03-16 DIAGNOSIS — M79.672 PAIN IN LEFT FOOT: ICD-10-CM

## 2022-03-16 DIAGNOSIS — D23.72 BENIGN NEOPLASM OF SKIN OF FOOT, LEFT: ICD-10-CM

## 2022-03-16 DIAGNOSIS — D23.71 BENIGN NEOPLASM OF SKIN OF FOOT, RIGHT: Primary | ICD-10-CM

## 2022-03-16 DIAGNOSIS — B35.1 ONYCHOMYCOSIS: ICD-10-CM

## 2022-03-16 DIAGNOSIS — L40.9 PSORIASIS: ICD-10-CM

## 2022-03-16 DIAGNOSIS — E11.40 CONTROLLED TYPE 2 DIABETES WITH NEUROPATHY (HCC): ICD-10-CM

## 2022-03-16 DIAGNOSIS — M20.41 HAMMER TOE OF RIGHT FOOT: ICD-10-CM

## 2022-03-16 PROCEDURE — 99213 OFFICE O/P EST LOW 20 MIN: CPT | Performed by: PODIATRIST

## 2022-03-16 ASSESSMENT — ENCOUNTER SYMPTOMS
DIARRHEA: 0
NAUSEA: 0
CONSTIPATION: 0
COLOR CHANGE: 0
VOMITING: 0

## 2022-03-16 NOTE — PROGRESS NOTES
[facility]  Return Patient     Huan Mensah 66 y.o. female that presents for follow-up of   Chief Complaint   Patient presents with    Nail Problem     b/l nail trim/ last seen Dr. Tony Hubbard     b/l     Follow up hyperkeratosis. dyshydrosis  both feet, foot pain, psoriasis. Using Aquaphor and Camea cream. Using lidex ointment. This has helped. Follow up flexor tenotomy of her right 3rd toe. She is doing very well, no pain. Has a new painful callous right heel. Left sub 5th met head. History of hammertoes, right 3rd toe is still painful with a corn. right worse than left, corn on toe, 3rd toe, very painful. Has spacers, toe crests, silicone toe     Currently denies F/C/N/V. Allergies   Allergen Reactions    Latex        Past Medical History:   Diagnosis Date    Arthritis     Bilateral carpal tunnel syndrome     Chronic kidney disease     Diabetes mellitus ([facility] [address]     Diabetic nephropathy (HCC)     Hyperlipemia     Night cramps     Osteopenia     Seizure disorder (HCC)     Tingling in extremities        Prior to Admission medications    Medication Sig Start Date End Date Taking?  Authorizing Provider   fluocinonide (LIDEX) 0.05 % ointment APPLY TO AFFECTED AREA TWO TIMES A DAY 10/18/21  Yes Kori Castle DPM   chlorhexidine (PERIDEX) 0.12 % solution  4/24/21  Yes Historical Provider, MD   KLOR-CON M20 20 MEQ extended release tablet  9/10/20  Yes Historical Provider, MD   clobetasol (TEMOVATE) 0.05 % cream  5/22/20  Yes Historical Provider, MD   L-Methylfolate-Algae-B12-B6 (METANX PO) Take by mouth   Yes Historical Provider, MD   lisinopril (PRINIVIL;ZESTRIL) 2.5 MG tablet Take 2.5 mg by mouth daily   Yes Historical Provider, MD   simvastatin (ZOCOR) 20 MG tablet Take 20 mg by mouth nightly   Yes Historical Provider, MD   potassium chloride (KLOR-CON M) 20 MEQ TBCR extended release tablet Take 20 mEq by mouth daily (with breakfast)   Yes Historical Provider, MD   metFORMIN (GLUCOPHAGE) 500 MG tablet Take 500 mg by mouth 2 times daily (with meals)   Yes Historical Provider, MD   Multiple Vitamins-Minerals (CENTRUM SILVER 50+WOMEN) TABS Take by mouth daily   Yes Historical Provider, MD   Magnesium 500 MG TABS Take 500 mg by mouth daily   Yes Historical Provider, MD   aspirin 81 MG tablet Take 81 mg by mouth daily   Yes Historical Provider, MD   NONFORMULARY Take by mouth daily Neora Vitamin   Yes Historical Provider, MD       Review of Systems   Constitutional: Negative for chills, diaphoresis, fatigue, fever and unexpected weight change. Cardiovascular: Negative for leg swelling. Gastrointestinal: Negative for constipation, diarrhea, nausea and vomiting. Musculoskeletal: Positive for gait problem. Negative for arthralgias and joint swelling. Skin: Negative for color change, pallor, rash and wound. Neurological: Negative for weakness and numbness. Lower Extremity Physical Examination:     Vitals: There were no vitals filed for this visit. General: AAO x 3 in NAD. Integument:   Warm, dry, supple, Bilateral.  Open lesion absent, Bilateral. hyperkeratosis sub heel right, sub 5th left. Feet improved. Dry scaly skin scattered. Vascular: DP and PT pulses palpable 2/4, Bilateral.  CFT <3 seconds, Bilateral.  Hair growth absent to the level of the digits, Bilateral.  Edema absent, Bilateral.  Varicosities absent, Bilateral. Erythema absent, Bilateral    Neurological:  Sensation present to light touch to level of digits, Bilateral.    Musculoskeletal: Muscle strength 5/5, Bilateral.  Right 3rd toe, sitting straight, hyperkeratosis resolved    Asessment: Patient is a 66 y.o. female with:   1. Benign neoplasm of skin of foot, right    2. Hammer toe of right foot    3. Psoriasis of nail    4. Pain in right foot    5. Controlled type 2 diabetes with neuropathy ([facility] [address]    6. Benign neoplasm of skin of foot, left    7. Pain in left foot    8. Onychomycosis    9. Psoriasis    10. Corn of toe        Plan: Pt was evaluated and examined. Patient was given personalized discharge instructions. Nails 1-10 were debrided sharply in length and thickness with a nipper and , without incident. Pt will follow up in 4 weeks or sooner if any problems arise. Diagnosis was discussed with the pt and all of their questions were answered in detail. Proper foot hygiene and care was discussed with the pt. Informed patient on proper diabetic foot care and importance of tight glycemic control. Patient to check feet daily and contact the office with any questions/problems/concerns. Other comorbidity noted and will be managed by PCP. Aquaphor daily  Lidex cream    The lesion(s) was partially debrided, enucleated, and silver nitrate was applied with an apperature pad under occlusion. The patient will leave in place for 24-48 hours and than remove. The patient tolerated the procedure well and without complication. No orders of the defined types were placed in this encounter. No orders of the defined types were placed in this encounter.        RTC in 1 months  3/16/2022

## 2022-04-13 ENCOUNTER — OFFICE VISIT (OUTPATIENT)
Dept: PODIATRY | Age: 72
End: 2022-04-13
Payer: COMMERCIAL

## 2022-04-13 VITALS — HEIGHT: 62 IN | WEIGHT: 158 LBS | BODY MASS INDEX: 29.08 KG/M2

## 2022-04-13 DIAGNOSIS — M79.672 PAIN IN LEFT FOOT: ICD-10-CM

## 2022-04-13 DIAGNOSIS — D23.72 BENIGN NEOPLASM OF SKIN OF FOOT, LEFT: ICD-10-CM

## 2022-04-13 DIAGNOSIS — E11.40 CONTROLLED TYPE 2 DIABETES WITH NEUROPATHY (HCC): ICD-10-CM

## 2022-04-13 DIAGNOSIS — L40.9 PSORIASIS: ICD-10-CM

## 2022-04-13 DIAGNOSIS — M20.41 HAMMER TOE OF RIGHT FOOT: ICD-10-CM

## 2022-04-13 DIAGNOSIS — D23.71 BENIGN NEOPLASM OF SKIN OF FOOT, RIGHT: Primary | ICD-10-CM

## 2022-04-13 DIAGNOSIS — L84 CORN OF TOE: ICD-10-CM

## 2022-04-13 DIAGNOSIS — B35.1 ONYCHOMYCOSIS: ICD-10-CM

## 2022-04-13 DIAGNOSIS — M79.671 PAIN IN RIGHT FOOT: ICD-10-CM

## 2022-04-13 DIAGNOSIS — L40.9 PSORIASIS OF NAIL: ICD-10-CM

## 2022-04-13 PROCEDURE — 99213 OFFICE O/P EST LOW 20 MIN: CPT | Performed by: PODIATRIST

## 2022-04-13 ASSESSMENT — ENCOUNTER SYMPTOMS
COLOR CHANGE: 0
NAUSEA: 0
DIARRHEA: 0
VOMITING: 0
CONSTIPATION: 0

## 2022-04-13 NOTE — PROGRESS NOTES
[facility]  Return Patient     Ann Mondragon 66 y.o. female that presents for follow-up of   Chief Complaint   Patient presents with    Callouses     b/l, last seen Kalyani Sher MD 11/4/21     Follow up hyperkeratosis. dyshydrosis  both feet, foot pain, psoriasis. Using Aquaphor and Camea cream. Using lidex ointment. This has helped. Follow up flexor tenotomy of her right 3rd toe. She is doing very well, no pain. Has a new painful callous right heel. Left sub 5th met head. History of hammertoes, right 3rd toe is still painful with a corn. right worse than left, corn on toe, 3rd toe, very painful. Has spacers, toe crests, silicone toe     Currently denies F/C/N/V. Allergies   Allergen Reactions    Latex        Past Medical History:   Diagnosis Date    Arthritis     Bilateral carpal tunnel syndrome     Chronic kidney disease     Diabetes mellitus ([facility] [address]     Diabetic nephropathy (HCC)     Hyperlipemia     Night cramps     Osteopenia     Seizure disorder (HCC)     Tingling in extremities        Prior to Admission medications    Medication Sig Start Date End Date Taking?  Authorizing Provider   fluocinonide (LIDEX) 0.05 % ointment APPLY TO AFFECTED AREA TWO TIMES A DAY 10/18/21  Yes Slade Quarles DPM   chlorhexidine (PERIDEX) 0.12 % solution  4/24/21  Yes Historical Provider, MD   KLOR-CON M20 20 MEQ extended release tablet  9/10/20  Yes Historical Provider, MD   clobetasol (TEMOVATE) 0.05 % cream  5/22/20  Yes Historical Provider, MD   L-Methylfolate-Algae-B12-B6 (METANX PO) Take by mouth   Yes Historical Provider, MD   lisinopril (PRINIVIL;ZESTRIL) 2.5 MG tablet Take 2.5 mg by mouth daily   Yes Historical Provider, MD   simvastatin (ZOCOR) 20 MG tablet Take 20 mg by mouth nightly   Yes Historical Provider, MD   potassium chloride (KLOR-CON M) 20 MEQ TBCR extended release tablet Take 20 mEq by mouth daily (with breakfast)   Yes Historical Provider, MD   metFORMIN (GLUCOPHAGE) 500 MG tablet Take 500 mg by mouth 2 times daily (with meals)   Yes Historical Provider, MD   Multiple Vitamins-Minerals (CENTRUM SILVER 50+WOMEN) TABS Take by mouth daily   Yes Historical Provider, MD   Magnesium 500 MG TABS Take 500 mg by mouth daily   Yes Historical Provider, MD   aspirin 81 MG tablet Take 81 mg by mouth daily   Yes Historical Provider, MD   NONFORMULARY Take by mouth daily Neora Vitamin   Yes Historical Provider, MD       Review of Systems   Constitutional: Negative for chills, diaphoresis, fatigue, fever and unexpected weight change. Cardiovascular: Negative for leg swelling. Gastrointestinal: Negative for constipation, diarrhea, nausea and vomiting. Musculoskeletal: Positive for gait problem. Negative for arthralgias and joint swelling. Skin: Negative for color change, pallor, rash and wound. Neurological: Negative for weakness and numbness. Lower Extremity Physical Examination:     Vitals: There were no vitals filed for this visit. General: AAO x 3 in NAD. Integument:   Warm, dry, supple, Bilateral.  Open lesion absent, Bilateral. hyperkeratosis sub heel right, sub 5th left. Feet improved. Dry scaly skin scattered. Vascular: DP and PT pulses palpable 2/4, Bilateral.  CFT <3 seconds, Bilateral.  Hair growth absent to the level of the digits, Bilateral.  Edema absent, Bilateral.  Varicosities absent, Bilateral. Erythema absent, Bilateral    Neurological:  Sensation present to light touch to level of digits, Bilateral.    Musculoskeletal: Muscle strength 5/5, Bilateral.  Right 3rd toe, sitting straight, hyperkeratosis resolved    Asessment: Patient is a 66 y.o. female with:   1. Benign neoplasm of skin of foot, right    2. Hammer toe of right foot    3. Psoriasis of nail    4. Pain in right foot    5. Controlled type 2 diabetes with neuropathy ([address]    6. Benign neoplasm of skin of foot, left    7. Pain in left foot    8. Onychomycosis    9. Psoriasis    10.  Corn of toe Plan: Pt was evaluated and examined. Patient was given personalized discharge instructions. Nails 1-10 were debrided sharply in length and thickness with a nipper and , without incident. Pt will follow up in 4 weeks or sooner if any problems arise. Diagnosis was discussed with the pt and all of their questions were answered in detail. Proper foot hygiene and care was discussed with the pt. Informed patient on proper diabetic foot care and importance of tight glycemic control. Patient to check feet daily and contact the office with any questions/problems/concerns. Other comorbidity noted and will be managed by PCP. Aquaphor daily  Lidex cream    The lesion(s) was partially debrided, enucleated, and silver nitrate was applied with an apperature pad under occlusion. The patient will leave in place for 24-48 hours and than remove. The patient tolerated the procedure well and without complication. No orders of the defined types were placed in this encounter. No orders of the defined types were placed in this encounter.        RTC in 1 months  4/13/2022

## 2022-05-11 ENCOUNTER — OFFICE VISIT (OUTPATIENT)
Dept: PODIATRY | Age: 72
End: 2022-05-11
Payer: COMMERCIAL

## 2022-05-11 VITALS — WEIGHT: 158 LBS | HEIGHT: 62 IN | BODY MASS INDEX: 29.08 KG/M2

## 2022-05-11 DIAGNOSIS — E11.40 CONTROLLED TYPE 2 DIABETES WITH NEUROPATHY (HCC): ICD-10-CM

## 2022-05-11 DIAGNOSIS — D23.71 BENIGN NEOPLASM OF SKIN OF FOOT, RIGHT: Primary | ICD-10-CM

## 2022-05-11 DIAGNOSIS — D23.72 BENIGN NEOPLASM OF SKIN OF FOOT, LEFT: ICD-10-CM

## 2022-05-11 DIAGNOSIS — M79.671 PAIN IN RIGHT FOOT: ICD-10-CM

## 2022-05-11 DIAGNOSIS — M20.41 HAMMER TOE OF RIGHT FOOT: ICD-10-CM

## 2022-05-11 DIAGNOSIS — L40.9 PSORIASIS: ICD-10-CM

## 2022-05-11 DIAGNOSIS — B35.1 ONYCHOMYCOSIS: ICD-10-CM

## 2022-05-11 DIAGNOSIS — M79.672 PAIN IN LEFT FOOT: ICD-10-CM

## 2022-05-11 DIAGNOSIS — L40.9 PSORIASIS OF NAIL: ICD-10-CM

## 2022-05-11 DIAGNOSIS — L84 CORN OF TOE: ICD-10-CM

## 2022-05-11 PROCEDURE — 99213 OFFICE O/P EST LOW 20 MIN: CPT | Performed by: PODIATRIST

## 2022-05-11 ASSESSMENT — ENCOUNTER SYMPTOMS
VOMITING: 0
DIARRHEA: 0
CONSTIPATION: 0
NAUSEA: 0
COLOR CHANGE: 0

## 2022-05-11 NOTE — PROGRESS NOTES
[facility]  Return Patient     Mahogany Plan 66 y.o. female that presents for follow-up of   Chief Complaint   Patient presents with    Nail Problem     b/l nail trim/ last seen Dr. Renetta Harrell 11/4/2021    Callouses     b/l      Follow up hyperkeratosis. dyshydrosis  both feet, foot pain, psoriasis. Using Aquaphor and Camea cream. Using lidex ointment. This has helped. Follow up flexor tenotomy of her right 3rd toe. She is doing very well, no pain. Has a new painful callous right heel. Left sub 5th met head. History of hammertoes, right 3rd toe is still painful with a corn. right worse than left, corn on toe, 3rd toe, very painful. Has spacers, toe crests, silicone toe     Currently denies F/C/N/V. Allergies   Allergen Reactions    Latex        Past Medical History:   Diagnosis Date    Arthritis     Bilateral carpal tunnel syndrome     Chronic kidney disease     [facility]     Diabetic nephropathy (HCC)     Hyperlipemia     Night cramps     Osteopenia     Seizure disorder ([facility])     Tingling in extremities        Prior to Admission medications    Medication Sig Start Date End Date Taking?  Authorizing Provider   fluocinonide (LIDEX) 0.05 % ointment APPLY TO AFFECTED AREA TWO TIMES A DAY 10/18/21  Yes [place] Finely, DPM   chlorhexidine (PERIDEX) 0.12 % solution  4/24/21  Yes Historical Provider, MD   KLOR-CON M20 20 MEQ extended release tablet  9/10/20  Yes Historical Provider, MD   clobetasol (TEMOVATE) 0.05 % cream  5/22/20  Yes Historical Provider, MD   L-Methylfolate-Algae-B12-B6 (METANX PO) Take by mouth   Yes Historical Provider, MD   lisinopril (PRINIVIL;ZESTRIL) 2.5 MG tablet Take 2.5 mg by mouth daily   Yes Historical Provider, MD   simvastatin (ZOCOR) 20 MG tablet Take 20 mg by mouth nightly   Yes Historical Provider, MD   potassium chloride (KLOR-CON M) 20 MEQ TBCR extended release tablet Take 20 mEq by mouth daily (with breakfast)   Yes Historical Provider, MD   metFORMIN (GLUCOPHAGE) 500 MG tablet Take 500 mg by mouth 2 times daily (with meals)   Yes Historical Provider, MD   Multiple Vitamins-Minerals (CENTRUM SILVER 50+WOMEN) TABS Take by mouth daily   Yes Historical Provider, MD   Magnesium 500 MG TABS Take 500 mg by mouth daily   Yes Historical Provider, MD   aspirin 81 MG tablet Take 81 mg by mouth daily   Yes Historical Provider, MD   NONFORMULARY Take by mouth daily Neora Vitamin   Yes Historical Provider, MD       Review of Systems   Constitutional: Negative for chills, diaphoresis, fatigue, fever and unexpected weight change. Cardiovascular: Negative for leg swelling. Gastrointestinal: Negative for constipation, diarrhea, nausea and vomiting. Musculoskeletal: Positive for gait problem. Negative for arthralgias and joint swelling. Skin: Negative for color change, pallor, rash and wound. Neurological: Negative for weakness and numbness. Lower Extremity Physical Examination:     Vitals: There were no vitals filed for this visit. General: AAO x 3 in NAD. Integument:   Warm, dry, supple, Bilateral.  Open lesion absent, Bilateral. hyperkeratosis sub heel right, sub 5th left. Feet improved. Dry scaly skin scattered. Vascular: DP and PT pulses palpable 2/4, Bilateral.  CFT <3 seconds, Bilateral.  Hair growth absent to the level of the digits, Bilateral.  Edema absent, Bilateral.  Varicosities absent, Bilateral. Erythema absent, Bilateral    Neurological:  Sensation present to light touch to level of digits, Bilateral.    Musculoskeletal: Muscle strength 5/5, Bilateral.  Right 3rd toe, sitting straight, hyperkeratosis resolved    Asessment: Patient is a 66 y.o. female with:   1. Benign neoplasm of skin of foot, right    2. Hammer toe of right foot    3. Psoriasis of nail    4. Pain in right foot    5. Controlled type 2 diabetes with neuropathy ([facility] [address]    6. Benign neoplasm of skin of foot, left    7. Pain in left foot    8. Onychomycosis    9. Psoriasis    10. Corn of toe        Plan: Pt was evaluated and examined. Patient was given personalized discharge instructions. Nails 1-10 were debrided sharply in length and thickness with a nipper and , without incident. Pt will follow up in 4 weeks or sooner if any problems arise. Diagnosis was discussed with the pt and all of their questions were answered in detail. Proper foot hygiene and care was discussed with the pt. Informed patient on proper diabetic foot care and importance of tight glycemic control. Patient to check feet daily and contact the office with any questions/problems/concerns. Other comorbidity noted and will be managed by PCP. Aquaphor daily  Lidex cream    The lesion(s) was partially debrided, enucleated, and silver nitrate was applied with an apperature pad under occlusion. The patient will leave in place for 24-48 hours and than remove. The patient tolerated the procedure well and without complication. No orders of the defined types were placed in this encounter. No orders of the defined types were placed in this encounter.        RTC in 1 months  5/11/2022

## 2022-06-08 ENCOUNTER — OFFICE VISIT (OUTPATIENT)
Dept: PODIATRY | Age: 72
End: 2022-06-08
Payer: COMMERCIAL

## 2022-06-08 VITALS — WEIGHT: 158 LBS | HEIGHT: 65 IN | BODY MASS INDEX: 26.33 KG/M2

## 2022-06-08 DIAGNOSIS — M20.41 HAMMER TOE OF RIGHT FOOT: ICD-10-CM

## 2022-06-08 DIAGNOSIS — D23.71 BENIGN NEOPLASM OF SKIN OF FOOT, RIGHT: Primary | ICD-10-CM

## 2022-06-08 DIAGNOSIS — L40.9 PSORIASIS OF NAIL: ICD-10-CM

## 2022-06-08 DIAGNOSIS — B35.1 ONYCHOMYCOSIS: ICD-10-CM

## 2022-06-08 DIAGNOSIS — D23.72 BENIGN NEOPLASM OF SKIN OF FOOT, LEFT: ICD-10-CM

## 2022-06-08 DIAGNOSIS — M79.671 PAIN IN RIGHT FOOT: ICD-10-CM

## 2022-06-08 DIAGNOSIS — L40.9 PSORIASIS: ICD-10-CM

## 2022-06-08 DIAGNOSIS — L84 CORN OF TOE: ICD-10-CM

## 2022-06-08 PROCEDURE — 99213 OFFICE O/P EST LOW 20 MIN: CPT | Performed by: PODIATRIST

## 2022-06-08 PROCEDURE — 1123F ACP DISCUSS/DSCN MKR DOCD: CPT | Performed by: PODIATRIST

## 2022-06-08 ASSESSMENT — ENCOUNTER SYMPTOMS
CONSTIPATION: 0
COLOR CHANGE: 0
DIARRHEA: 0
VOMITING: 0
NAUSEA: 0

## 2022-06-08 NOTE — PROGRESS NOTES
[facility]  Return Patient     Lilia Rodríguez 66 y.o. female that presents for follow-up of   Chief Complaint   Patient presents with    Callouses     callous trim b/l      Follow up hyperkeratosis. dyshydrosis  both feet, foot pain, psoriasis. Using Aquaphor and Camea cream. Using lidex ointment. This has helped. Follow up flexor tenotomy of her right 3rd toe. She is doing very well, no pain. Has a new painful callous right heel. Left sub 5th met head. History of hammertoes, right 3rd toe is still painful with a corn. right worse than left, corn on toe, 3rd toe, very painful. Has spacers, toe crests, silicone toe     Currently denies F/C/N/V. Allergies   Allergen Reactions    Latex        Past Medical History:   Diagnosis Date    Arthritis     Bilateral carpal tunnel syndrome     Chronic kidney disease     Diabetes mellitus ([facility] [address]     Diabetic nephropathy (HCC)     Hyperlipemia     Night cramps     Osteopenia     Seizure disorder (HCC)     Tingling in extremities        Prior to Admission medications    Medication Sig Start Date End Date Taking?  Authorizing Provider   fluocinonide (LIDEX) 0.05 % ointment APPLY TO AFFECTED AREA TWO TIMES A DAY 10/18/21  Yes LAKISHA Alves-CON M20 20 MEQ extended release tablet  9/10/20  Yes Historical Provider, MD   clobetasol (TEMOVATE) 0.05 % cream  5/22/20  Yes Historical Provider, MD   L-Methylfolate-Algae-B12-B6 (METANX PO) Take by mouth   Yes Historical Provider, MD   lisinopril (PRINIVIL;ZESTRIL) 2.5 MG tablet Take 2.5 mg by mouth daily   Yes Historical Provider, MD   simvastatin (ZOCOR) 20 MG tablet Take 20 mg by mouth nightly   Yes Historical Provider, MD   potassium chloride (KLOR-CON M) 20 MEQ TBCR extended release tablet Take 20 mEq by mouth daily (with breakfast)   Yes Historical Provider, MD   metFORMIN (GLUCOPHAGE) 500 MG tablet Take 500 mg by mouth 2 times daily (with meals)   Yes Historical Provider, MD   Multiple Vitamins-Minerals (CENTRUM SILVER 50+WOMEN) TABS Take by mouth daily   Yes Historical Provider, MD   Magnesium 500 MG TABS Take 500 mg by mouth daily   Yes Historical Provider, MD   aspirin 81 MG tablet Take 81 mg by mouth daily   Yes Historical Provider, MD   NONFORMULARY Take by mouth daily Neora Vitamin    Historical Provider, MD       Review of Systems   Constitutional: Negative for chills, diaphoresis, fatigue, fever and unexpected weight change. Cardiovascular: Negative for leg swelling. Gastrointestinal: Negative for constipation, diarrhea, nausea and vomiting. Musculoskeletal: Positive for gait problem. Negative for arthralgias and joint swelling. Skin: Negative for color change, pallor, rash and wound. Neurological: Negative for weakness and numbness. Lower Extremity Physical Examination:     Vitals: There were no vitals filed for this visit. General: AAO x 3 in NAD. Integument:   Warm, dry, supple, Bilateral.  Open lesion absent, Bilateral. hyperkeratosis sub heel right, sub 5th left. Feet improved. Dry scaly skin scattered. Vascular: DP and PT pulses palpable 2/4, Bilateral.  CFT <3 seconds, Bilateral.  Hair growth absent to the level of the digits, Bilateral.  Edema absent, Bilateral.  Varicosities absent, Bilateral. Erythema absent, Bilateral    Neurological:  Sensation present to light touch to level of digits, Bilateral.    Musculoskeletal: Muscle strength 5/5, Bilateral.  Right 3rd toe, sitting straight, hyperkeratosis resolved    Asessment: Patient is a 66 y.o. female with:   1. Benign neoplasm of skin of foot, right    2. Hammer toe of right foot    3. Psoriasis of nail    4. Pain in right foot    5. Benign neoplasm of skin of foot, left    6. Onychomycosis    7. Psoriasis    8. Corn of toe        Plan: Pt was evaluated and examined. Patient was given personalized discharge instructions.   Nails 1-10 were debrided sharply in length and thickness with a nipper and , without incident. Pt will follow up in 4 weeks or sooner if any problems arise. Diagnosis was discussed with the pt and all of their questions were answered in detail. Proper foot hygiene and care was discussed with the pt. Informed patient on proper diabetic foot care and importance of tight glycemic control. Patient to check feet daily and contact the office with any questions/problems/concerns. Other comorbidity noted and will be managed by PCP. Aquaphor daily  Lidex cream    The lesion(s) was partially debrided, enucleated, and silver nitrate was applied with an apperature pad under occlusion. The patient will leave in place for 24-48 hours and than remove. The patient tolerated the procedure well and without complication. No orders of the defined types were placed in this encounter. No orders of the defined types were placed in this encounter.        RTC in 1 months  6/8/2022

## 2022-07-06 ENCOUNTER — OFFICE VISIT (OUTPATIENT)
Dept: PODIATRY | Age: 72
End: 2022-07-06
Payer: COMMERCIAL

## 2022-07-06 VITALS — HEIGHT: 65 IN | BODY MASS INDEX: 26.33 KG/M2 | WEIGHT: 158 LBS

## 2022-07-06 DIAGNOSIS — D23.71 BENIGN NEOPLASM OF SKIN OF FOOT, RIGHT: Primary | ICD-10-CM

## 2022-07-06 DIAGNOSIS — E11.40 CONTROLLED TYPE 2 DIABETES WITH NEUROPATHY (HCC): ICD-10-CM

## 2022-07-06 DIAGNOSIS — D23.72 BENIGN NEOPLASM OF SKIN OF FOOT, LEFT: ICD-10-CM

## 2022-07-06 DIAGNOSIS — L40.9 PSORIASIS: ICD-10-CM

## 2022-07-06 DIAGNOSIS — B35.1 ONYCHOMYCOSIS: ICD-10-CM

## 2022-07-06 DIAGNOSIS — M79.671 PAIN IN RIGHT FOOT: ICD-10-CM

## 2022-07-06 DIAGNOSIS — M79.672 PAIN IN LEFT FOOT: ICD-10-CM

## 2022-07-06 DIAGNOSIS — M20.41 HAMMER TOE OF RIGHT FOOT: ICD-10-CM

## 2022-07-06 DIAGNOSIS — L84 CORN OF TOE: ICD-10-CM

## 2022-07-06 DIAGNOSIS — L40.9 PSORIASIS OF NAIL: ICD-10-CM

## 2022-07-06 PROCEDURE — 1123F ACP DISCUSS/DSCN MKR DOCD: CPT | Performed by: PODIATRIST

## 2022-07-06 PROCEDURE — 99213 OFFICE O/P EST LOW 20 MIN: CPT | Performed by: PODIATRIST

## 2022-07-06 ASSESSMENT — ENCOUNTER SYMPTOMS
COLOR CHANGE: 0
CONSTIPATION: 0
NAUSEA: 0
VOMITING: 0
DIARRHEA: 0

## 2022-07-06 NOTE — PROGRESS NOTES
[facility]  Return Patient     Elana Booker 66 y.o. female that presents for follow-up of   Chief Complaint   Patient presents with    Callouses     b/l callous trim     Follow up hyperkeratosis. dyshydrosis  both feet, foot pain, psoriasis. Using Aquaphor and Camea cream. Using lidex ointment. This has helped. Follow up flexor tenotomy of her right 3rd toe. She is doing very well, no pain. Has a new painful callous right heel. Left sub 5th met head. History of hammertoes, right 3rd toe is still painful with a corn. right worse than left, corn on toe, 3rd toe, very painful. Has spacers, toe crests, silicone toe     Currently denies F/C/N/V. Allergies   Allergen Reactions    Latex        Past Medical History:   Diagnosis Date    Arthritis     Bilateral carpal tunnel syndrome     Chronic kidney disease     Diabetes mellitus ([facility] [address]     Diabetic nephropathy (HCC)     Hyperlipemia     Night cramps     Osteopenia     Seizure disorder (HCC)     Tingling in extremities        Prior to Admission medications    Medication Sig Start Date End Date Taking?  Authorizing Provider   fluocinonide (LIDEX) 0.05 % ointment APPLY TO AFFECTED AREA TWO TIMES A DAY 10/18/21  Yes Magi Brito DPM   KLOR-CON M20 20 MEQ extended release tablet  9/10/20  Yes Historical Provider, MD   clobetasol (TEMOVATE) 0.05 % cream  5/22/20  Yes Historical Provider, MD   L-Methylfolate-Algae-B12-B6 (METANX PO) Take by mouth   Yes Historical Provider, MD   lisinopril (PRINIVIL;ZESTRIL) 2.5 MG tablet Take 2.5 mg by mouth daily   Yes Historical Provider, MD   simvastatin (ZOCOR) 20 MG tablet Take 20 mg by mouth nightly   Yes Historical Provider, MD   potassium chloride (KLOR-CON M) 20 MEQ TBCR extended release tablet Take 20 mEq by mouth daily (with breakfast)   Yes Historical Provider, MD   metFORMIN (GLUCOPHAGE) 500 MG tablet Take 500 mg by mouth 2 times daily (with meals)   Yes Historical Provider, MD   Multiple Vitamins-Minerals (CENTRUM SILVER 50+WOMEN) TABS Take by mouth daily   Yes Historical Provider, MD   Magnesium 500 MG TABS Take 500 mg by mouth daily   Yes Historical Provider, MD   aspirin 81 MG tablet Take 81 mg by mouth daily   Yes Historical Provider, MD   NONFORMULARY Take by mouth daily Neora Vitamin   Yes Historical Provider, MD       Review of Systems   Constitutional: Negative for chills, diaphoresis, fatigue, fever and unexpected weight change. Cardiovascular: Negative for leg swelling. Gastrointestinal: Negative for constipation, diarrhea, nausea and vomiting. Musculoskeletal: Positive for gait problem. Negative for arthralgias and joint swelling. Skin: Negative for color change, pallor, rash and wound. Neurological: Negative for weakness and numbness. Lower Extremity Physical Examination:     Vitals: There were no vitals filed for this visit. General: AAO x 3 in NAD. Integument:   Warm, dry, supple, Bilateral.  Open lesion absent, Bilateral. hyperkeratosis sub heel right, sub 5th left. Feet improved. Dry scaly skin scattered. Vascular: DP and PT pulses palpable 2/4, Bilateral.  CFT <3 seconds, Bilateral.  Hair growth absent to the level of the digits, Bilateral.  Edema absent, Bilateral.  Varicosities absent, Bilateral. Erythema absent, Bilateral    Neurological:  Sensation present to light touch to level of digits, Bilateral.    Musculoskeletal: Muscle strength 5/5, Bilateral.  Right 3rd toe, sitting straight, hyperkeratosis resolved    Asessment: Patient is a 66 y.o. female with:   1. Benign neoplasm of skin of foot, right    2. Hammer toe of right foot    3. Psoriasis of nail    4. Pain in right foot    5. Benign neoplasm of skin of foot, left    6. Onychomycosis    7. Psoriasis    8. Corn of toe    9. Controlled type 2 diabetes with neuropathy ([facility] [address]    10. Pain in left foot        Plan: Pt was evaluated and examined. Patient was given personalized discharge instructions. Nails 1-10 were debrided sharply in length and thickness with a nipper and , without incident. Pt will follow up in 4 weeks or sooner if any problems arise. Diagnosis was discussed with the pt and all of their questions were answered in detail. Proper foot hygiene and care was discussed with the pt. Informed patient on proper diabetic foot care and importance of tight glycemic control. Patient to check feet daily and contact the office with any questions/problems/concerns. Other comorbidity noted and will be managed by PCP. Aquaphor daily  Lidex cream    The lesion(s) was partially debrided, enucleated, and silver nitrate was applied with an apperature pad under occlusion. The patient will leave in place for 24-48 hours and than remove. The patient tolerated the procedure well and without complication. No orders of the defined types were placed in this encounter. No orders of the defined types were placed in this encounter.        RTC in 1 months  7/6/2022

## 2022-08-03 ENCOUNTER — OFFICE VISIT (OUTPATIENT)
Dept: PODIATRY | Age: 72
End: 2022-08-03
Payer: COMMERCIAL

## 2022-08-03 VITALS — HEIGHT: 65 IN | BODY MASS INDEX: 26.33 KG/M2 | WEIGHT: 158 LBS

## 2022-08-03 DIAGNOSIS — L40.9 PSORIASIS OF NAIL: ICD-10-CM

## 2022-08-03 DIAGNOSIS — B35.1 ONYCHOMYCOSIS: ICD-10-CM

## 2022-08-03 DIAGNOSIS — L84 CORN OF TOE: ICD-10-CM

## 2022-08-03 DIAGNOSIS — L40.9 PSORIASIS: ICD-10-CM

## 2022-08-03 DIAGNOSIS — M79.671 PAIN IN RIGHT FOOT: ICD-10-CM

## 2022-08-03 DIAGNOSIS — D23.71 BENIGN NEOPLASM OF SKIN OF FOOT, RIGHT: Primary | ICD-10-CM

## 2022-08-03 DIAGNOSIS — M20.41 HAMMER TOE OF RIGHT FOOT: ICD-10-CM

## 2022-08-03 DIAGNOSIS — D23.72 BENIGN NEOPLASM OF SKIN OF FOOT, LEFT: ICD-10-CM

## 2022-08-03 PROCEDURE — 99213 OFFICE O/P EST LOW 20 MIN: CPT | Performed by: PODIATRIST

## 2022-08-03 PROCEDURE — 1123F ACP DISCUSS/DSCN MKR DOCD: CPT | Performed by: PODIATRIST

## 2022-08-03 ASSESSMENT — ENCOUNTER SYMPTOMS
NAUSEA: 0
COLOR CHANGE: 0
DIARRHEA: 0
VOMITING: 0
CONSTIPATION: 0

## 2022-08-03 NOTE — PROGRESS NOTES
[facility]  Return Patient     Veronica Ventura 66 y.o. female that presents for follow-up of   Chief Complaint   Patient presents with    Callouses     Callous trim b/l      Follow up hyperkeratosis. dyshydrosis  both feet, foot pain, psoriasis. Using Aquaphor and Camea cream. Using lidex ointment. This has helped. Follow up flexor tenotomy of her right 3rd toe. She is doing very well, no pain. Has a new painful callous right heel. Left sub 5th met head. History of hammertoes, right 3rd toe is still painful with a corn. right worse than left, corn on toe, 3rd toe, very painful. Has spacers, toe crests, silicone toe     Currently denies F/C/N/V. Allergies   Allergen Reactions    Latex        Past Medical History:   Diagnosis Date    Arthritis     Bilateral carpal tunnel syndrome     Chronic kidney disease     Diabetes mellitus (HCC)     Diabetic nephropathy (HCC)     Hyperlipemia     Night cramps     Osteopenia     Seizure disorder ([address]     Tingling in extremities        Prior to Admission medications    Medication Sig Start Date End Date Taking?  Authorizing Provider   fluocinonide (LIDEX) 0.05 % ointment APPLY TO AFFECTED AREA TWO TIMES A DAY 10/18/21  Yes LAKISHA Martinez-CON M20 20 MEQ extended release tablet  9/10/20  Yes Historical Provider, MD   clobetasol (TEMOVATE) 0.05 % cream  5/22/20  Yes Historical Provider, MD   L-Methylfolate-Algae-B12-B6 (METANX PO) Take by mouth   Yes Historical Provider, MD   lisinopril (PRINIVIL;ZESTRIL) 2.5 MG tablet Take 2.5 mg by mouth daily   Yes Historical Provider, MD   simvastatin (ZOCOR) 20 MG tablet Take 20 mg by mouth nightly   Yes Historical Provider, MD   potassium chloride (KLOR-CON M) 20 MEQ TBCR extended release tablet Take 20 mEq by mouth daily (with breakfast)   Yes Historical Provider, MD   metFORMIN (GLUCOPHAGE) 500 MG tablet Take 500 mg by mouth 2 times daily (with meals)   Yes Historical Provider, MD   Multiple Vitamins-Minerals (CENTRUM SILVER 50+WOMEN) TABS Take by mouth daily   Yes Historical Provider, MD   Magnesium 500 MG TABS Take 500 mg by mouth daily   Yes Historical Provider, MD   aspirin 81 MG tablet Take 81 mg by mouth daily   Yes Historical Provider, MD   NONFORMULARY Take by mouth daily Neora Vitamin   Yes Historical Provider, MD       Review of Systems   Constitutional:  Negative for chills, diaphoresis, fatigue, fever and unexpected weight change. Cardiovascular:  Negative for leg swelling. Gastrointestinal:  Negative for constipation, diarrhea, nausea and vomiting. Musculoskeletal:  Positive for gait problem. Negative for arthralgias and joint swelling. Skin:  Negative for color change, pallor, rash and wound. Neurological:  Negative for weakness and numbness. Lower Extremity Physical Examination:     Vitals: There were no vitals filed for this visit. General: AAO x 3 in NAD. Integument:   Warm, dry, supple, Bilateral.  Open lesion absent, Bilateral. hyperkeratosis sub heel right, sub 5th left. Feet improved. Dry scaly skin scattered. Vascular: DP and PT pulses palpable 2/4, Bilateral.  CFT <3 seconds, Bilateral.  Hair growth absent to the level of the digits, Bilateral.  Edema absent, Bilateral.  Varicosities absent, Bilateral. Erythema absent, Bilateral    Neurological:  Sensation present to light touch to level of digits, Bilateral.    Musculoskeletal: Muscle strength 5/5, Bilateral.  Right 3rd toe, sitting straight, hyperkeratosis resolved    Asessment: Patient is a 66 y.o. female with:   1. Benign neoplasm of skin of foot, right    2. Hammer toe of right foot    3. Psoriasis of nail    4. Pain in right foot    5. Benign neoplasm of skin of foot, left    6. Onychomycosis    7. Psoriasis    8. Corn of toe        Plan: Pt was evaluated and examined. Patient was given personalized discharge instructions.   Nails 1-10 were debrided sharply in length and thickness with a nipper and , without incident. Pt will follow up in 4 weeks or sooner if any problems arise. Diagnosis was discussed with the pt and all of their questions were answered in detail. Proper foot hygiene and care was discussed with the pt. Informed patient on proper diabetic foot care and importance of tight glycemic control. Patient to check feet daily and contact the office with any questions/problems/concerns. Other comorbidity noted and will be managed by PCP. Aquaphor daily  Lidex cream    The lesion(s) was partially debrided, enucleated, and silver nitrate was applied with an apperature pad under occlusion. The patient will leave in place for 24-48 hours and than remove. The patient tolerated the procedure well and without complication. No orders of the defined types were placed in this encounter. No orders of the defined types were placed in this encounter.        RTC in 1 months  8/3/2022

## 2022-08-31 ENCOUNTER — OFFICE VISIT (OUTPATIENT)
Dept: PODIATRY | Age: 72
End: 2022-08-31
Payer: COMMERCIAL

## 2022-08-31 VITALS — WEIGHT: 158 LBS | BODY MASS INDEX: 26.33 KG/M2 | HEIGHT: 65 IN

## 2022-08-31 DIAGNOSIS — L40.9 PSORIASIS: ICD-10-CM

## 2022-08-31 DIAGNOSIS — M79.671 PAIN IN RIGHT FOOT: ICD-10-CM

## 2022-08-31 DIAGNOSIS — B35.1 ONYCHOMYCOSIS: ICD-10-CM

## 2022-08-31 DIAGNOSIS — D23.71 BENIGN NEOPLASM OF SKIN OF FOOT, RIGHT: Primary | ICD-10-CM

## 2022-08-31 DIAGNOSIS — L40.9 PSORIASIS OF NAIL: ICD-10-CM

## 2022-08-31 DIAGNOSIS — M79.672 PAIN IN LEFT FOOT: ICD-10-CM

## 2022-08-31 DIAGNOSIS — L84 CORN OF TOE: ICD-10-CM

## 2022-08-31 DIAGNOSIS — M20.41 HAMMER TOE OF RIGHT FOOT: ICD-10-CM

## 2022-08-31 DIAGNOSIS — D23.72 BENIGN NEOPLASM OF SKIN OF FOOT, LEFT: ICD-10-CM

## 2022-08-31 DIAGNOSIS — E11.40 CONTROLLED TYPE 2 DIABETES WITH NEUROPATHY (HCC): ICD-10-CM

## 2022-08-31 PROCEDURE — 99213 OFFICE O/P EST LOW 20 MIN: CPT | Performed by: PODIATRIST

## 2022-08-31 PROCEDURE — 1123F ACP DISCUSS/DSCN MKR DOCD: CPT | Performed by: PODIATRIST

## 2022-08-31 ASSESSMENT — ENCOUNTER SYMPTOMS
VOMITING: 0
DIARRHEA: 0
CONSTIPATION: 0
COLOR CHANGE: 0
NAUSEA: 0

## 2022-08-31 NOTE — PROGRESS NOTES
daily (with meals)   Yes Historical Provider, MD   Multiple Vitamins-Minerals (CENTRUM SILVER 50+WOMEN) TABS Take by mouth daily   Yes Historical Provider, MD   Magnesium 500 MG TABS Take 500 mg by mouth daily   Yes Historical Provider, MD   aspirin 81 MG tablet Take 81 mg by mouth daily   Yes Historical Provider, MD   NONFORMULARY Take by mouth daily Neora Vitamin   Yes Historical Provider, MD       Review of Systems   Constitutional:  Negative for chills, diaphoresis, fatigue, fever and unexpected weight change. Cardiovascular:  Negative for leg swelling. Gastrointestinal:  Negative for constipation, diarrhea, nausea and vomiting. Musculoskeletal:  Positive for gait problem. Negative for arthralgias and joint swelling. Skin:  Negative for color change, pallor, rash and wound. Neurological:  Negative for weakness and numbness. Lower Extremity Physical Examination:     Vitals: There were no vitals filed for this visit. General: AAO x 3 in NAD. Integument:   Warm, dry, supple, Bilateral.  Open lesion absent, Bilateral. hyperkeratosis sub heel right, sub 5th left. Feet improved. Dry scaly skin scattered. Vascular: DP and PT pulses palpable 2/4, Bilateral.  CFT <3 seconds, Bilateral.  Hair growth absent to the level of the digits, Bilateral.  Edema absent, Bilateral.  Varicosities absent, Bilateral. Erythema absent, Bilateral    Neurological:  Sensation present to light touch to level of digits, Bilateral.    Musculoskeletal: Muscle strength 5/5, Bilateral.  Right 3rd toe, sitting straight, hyperkeratosis resolved    Asessment: Patient is a 66 y.o. female with:   1. Benign neoplasm of skin of foot, right    2. Hammer toe of right foot    3. Psoriasis of nail    4. Pain in right foot    5. Benign neoplasm of skin of foot, left    6. Onychomycosis    7. Psoriasis    8. Corn of toe    9. Controlled type 2 diabetes with neuropathy ([address]    10.  Pain in left foot        Plan: Pt was evaluated and

## 2022-10-05 ENCOUNTER — OFFICE VISIT (OUTPATIENT)
Dept: PODIATRY | Age: 72
End: 2022-10-05
Payer: COMMERCIAL

## 2022-10-05 VITALS — WEIGHT: 158 LBS | BODY MASS INDEX: 26.33 KG/M2 | HEIGHT: 65 IN

## 2022-10-05 DIAGNOSIS — D23.72 BENIGN NEOPLASM OF SKIN OF FOOT, LEFT: ICD-10-CM

## 2022-10-05 DIAGNOSIS — M20.41 HAMMER TOE OF RIGHT FOOT: ICD-10-CM

## 2022-10-05 DIAGNOSIS — E11.40 CONTROLLED TYPE 2 DIABETES WITH NEUROPATHY (HCC): ICD-10-CM

## 2022-10-05 DIAGNOSIS — B35.1 ONYCHOMYCOSIS: ICD-10-CM

## 2022-10-05 DIAGNOSIS — M79.671 PAIN IN RIGHT FOOT: ICD-10-CM

## 2022-10-05 DIAGNOSIS — L84 CORN OF TOE: ICD-10-CM

## 2022-10-05 DIAGNOSIS — L40.9 PSORIASIS OF NAIL: ICD-10-CM

## 2022-10-05 DIAGNOSIS — D23.71 BENIGN NEOPLASM OF SKIN OF FOOT, RIGHT: Primary | ICD-10-CM

## 2022-10-05 DIAGNOSIS — L40.9 PSORIASIS: ICD-10-CM

## 2022-10-05 PROCEDURE — 1123F ACP DISCUSS/DSCN MKR DOCD: CPT | Performed by: PODIATRIST

## 2022-10-05 PROCEDURE — 99213 OFFICE O/P EST LOW 20 MIN: CPT | Performed by: PODIATRIST

## 2022-10-05 ASSESSMENT — ENCOUNTER SYMPTOMS
CONSTIPATION: 0
NAUSEA: 0
COLOR CHANGE: 0
VOMITING: 0
DIARRHEA: 0

## 2022-10-05 NOTE — PROGRESS NOTES
[facility]  Return Patient     Camden Muñoz 74 y.o. female that presents for follow-up of   Chief Complaint   Patient presents with    Callouses     B/l callous trim, last seen Hilda West MD 7/27/22    Diabetes     Last blood sugar 93     Follow up hyperkeratosis. dyshydrosis  both feet, foot pain, psoriasis. Using Aquaphor and Camea cream. Using lidex ointment. This has helped. Follow up flexor tenotomy of her right 3rd toe. She is doing very well, no pain. Has a new painful callous right heel. Left sub 5th met head. History of hammertoes, right 3rd toe is still painful with a corn. right worse than left, corn on toe, 3rd toe, very painful. Has spacers, toe crests, silicone toe     Currently denies F/C/N/V. Allergies   Allergen Reactions    Latex        Past Medical History:   Diagnosis Date    Arthritis     Bilateral carpal tunnel syndrome     Chronic kidney disease     Diabetes mellitus (HCC)     Diabetic nephropathy (HCC)     Hyperlipemia     Night cramps     Osteopenia     Seizure disorder ([address]     Tingling in extremities        Prior to Admission medications    Medication Sig Start Date End Date Taking?  Authorizing Provider   fluocinonide (LIDEX) 0.05 % ointment APPLY TO AFFECTED AREA TWO TIMES A DAY 10/18/21  Yes LAKISHA Terrell M20 20 MEQ extended release tablet  9/10/20  Yes Historical Provider, MD   clobetasol (TEMOVATE) 0.05 % cream  5/22/20  Yes Historical Provider, MD   L-Methylfolate-Algae-B12-B6 (METANX PO) Take by mouth   Yes Historical Provider, MD   lisinopril (PRINIVIL;ZESTRIL) 2.5 MG tablet Take 2.5 mg by mouth daily   Yes Historical Provider, MD   simvastatin (ZOCOR) 20 MG tablet Take 20 mg by mouth nightly   Yes Historical Provider, MD   potassium chloride (KLOR-CON M) 20 MEQ TBCR extended release tablet Take 20 mEq by mouth daily (with breakfast)   Yes Historical Provider, MD   metFORMIN (GLUCOPHAGE) 500 MG tablet Take 500 mg by mouth 2 times daily (with meals)   Yes Historical Provider, MD   Multiple Vitamins-Minerals (CENTRUM SILVER 50+WOMEN) TABS Take by mouth daily   Yes Historical Provider, MD   Magnesium 500 MG TABS Take 500 mg by mouth daily   Yes Historical Provider, MD   aspirin 81 MG tablet Take 81 mg by mouth daily   Yes Historical Provider, MD   NONFORMULARY Take by mouth daily Neora Vitamin   Yes Historical Provider, MD       Review of Systems   Constitutional:  Negative for chills, diaphoresis, fatigue, fever and unexpected weight change. Cardiovascular:  Negative for leg swelling. Gastrointestinal:  Negative for constipation, diarrhea, nausea and vomiting. Musculoskeletal:  Positive for gait problem. Negative for arthralgias and joint swelling. Skin:  Negative for color change, pallor, rash and wound. Neurological:  Negative for weakness and numbness. Lower Extremity Physical Examination:     Vitals: There were no vitals filed for this visit. General: AAO x 3 in NAD. Integument:   Warm, dry, supple, Bilateral.  Open lesion absent, Bilateral. hyperkeratosis sub heel right, sub 5th left. Feet improved. Dry scaly skin scattered. Vascular: DP and PT pulses palpable 2/4, Bilateral.  CFT <3 seconds, Bilateral.  Hair growth absent to the level of the digits, Bilateral.  Edema absent, Bilateral.  Varicosities absent, Bilateral. Erythema absent, Bilateral    Neurological:  Sensation present to light touch to level of digits, Bilateral.    Musculoskeletal: Muscle strength 5/5, Bilateral.  Right 3rd toe, sitting straight, hyperkeratosis resolved    Asessment: Patient is a 74 y.o. female with:   1. Benign neoplasm of skin of foot, right    2. Hammer toe of right foot    3. Psoriasis of nail    4. Pain in right foot    5. Benign neoplasm of skin of foot, left    6. Onychomycosis    7. Psoriasis    8. Corn of toe    9. Controlled type 2 diabetes with neuropathy ([facility] [address]        Plan: Pt was evaluated and examined.  Patient was given personalized discharge instructions. Nails 1-10 were debrided sharply in length and thickness with a nipper and , without incident. Pt will follow up in 4 weeks or sooner if any problems arise. Diagnosis was discussed with the pt and all of their questions were answered in detail. Proper foot hygiene and care was discussed with the pt. Informed patient on proper diabetic foot care and importance of tight glycemic control. Patient to check feet daily and contact the office with any questions/problems/concerns. Other comorbidity noted and will be managed by PCP. Aquaphor daily  Lidex cream    The lesion(s) was partially debrided, enucleated, and silver nitrate was applied with an apperature pad under occlusion. The patient will leave in place for 24-48 hours and than remove. The patient tolerated the procedure well and without complication. No orders of the defined types were placed in this encounter. No orders of the defined types were placed in this encounter.        RTC in 1 months  10/5/2022

## 2022-11-02 ENCOUNTER — OFFICE VISIT (OUTPATIENT)
Dept: PODIATRY | Age: 72
End: 2022-11-02
Payer: COMMERCIAL

## 2022-11-02 VITALS — BODY MASS INDEX: 26.33 KG/M2 | WEIGHT: 158 LBS | HEIGHT: 65 IN

## 2022-11-02 DIAGNOSIS — D23.71 BENIGN NEOPLASM OF SKIN OF FOOT, RIGHT: Primary | ICD-10-CM

## 2022-11-02 DIAGNOSIS — M79.671 PAIN IN RIGHT FOOT: ICD-10-CM

## 2022-11-02 DIAGNOSIS — M20.41 HAMMER TOE OF RIGHT FOOT: ICD-10-CM

## 2022-11-02 DIAGNOSIS — L40.9 PSORIASIS: ICD-10-CM

## 2022-11-02 DIAGNOSIS — L84 CORN OF TOE: ICD-10-CM

## 2022-11-02 DIAGNOSIS — D23.72 BENIGN NEOPLASM OF SKIN OF FOOT, LEFT: ICD-10-CM

## 2022-11-02 DIAGNOSIS — L40.9 PSORIASIS OF NAIL: ICD-10-CM

## 2022-11-02 DIAGNOSIS — E11.40 CONTROLLED TYPE 2 DIABETES WITH NEUROPATHY (HCC): ICD-10-CM

## 2022-11-02 DIAGNOSIS — B35.1 ONYCHOMYCOSIS: ICD-10-CM

## 2022-11-02 PROCEDURE — 1123F ACP DISCUSS/DSCN MKR DOCD: CPT | Performed by: PODIATRIST

## 2022-11-02 PROCEDURE — 99213 OFFICE O/P EST LOW 20 MIN: CPT | Performed by: PODIATRIST

## 2022-11-02 ASSESSMENT — ENCOUNTER SYMPTOMS
VOMITING: 0
DIARRHEA: 0
NAUSEA: 0
COLOR CHANGE: 0
CONSTIPATION: 0

## 2022-11-02 NOTE — PROGRESS NOTES
[facility]  Return Patient     Bea Lyn 74 y.o. female that presents for follow-up of   Chief Complaint   Patient presents with    Nail Problem     B/l nail trim/ last seen Dr. Betty Zurita 7/27/2022    Callouses     B/l callous trim     Follow up hyperkeratosis. dyshydrosis  both feet, foot pain, psoriasis. Using Aquaphor and Camea cream. Using lidex ointment. This has helped. Follow up flexor tenotomy of her right 3rd toe. She is doing very well, no pain. Has a new painful callous right heel. Left sub 5th met head. History of hammertoes, right 3rd toe is still painful with a corn. right worse than left, corn on toe, 3rd toe, very painful. Has spacers, toe crests, silicone toe     Currently denies F/C/N/V. Allergies   Allergen Reactions    Latex        Past Medical History:   Diagnosis Date    Arthritis     Bilateral carpal tunnel syndrome     Chronic kidney disease     Diabetes mellitus (HCC)     Diabetic nephropathy (HCC)     Hyperlipemia     Night cramps     Osteopenia     Seizure disorder ([facility] [address]     Tingling in extremities        Prior to Admission medications    Medication Sig Start Date End Date Taking?  Authorizing Provider   fluocinonide (LIDEX) 0.05 % ointment APPLY TO AFFECTED AREA TWO TIMES A DAY 10/18/21  Yes LAKISHA Carrero M20 20 MEQ extended release tablet  9/10/20  Yes Historical Provider, MD   clobetasol (TEMOVATE) 0.05 % cream  5/22/20  Yes Historical Provider, MD   L-Methylfolate-Algae-B12-B6 (METANX PO) Take by mouth   Yes Historical Provider, MD   lisinopril (PRINIVIL;ZESTRIL) 2.5 MG tablet Take 2.5 mg by mouth daily   Yes Historical Provider, MD   simvastatin (ZOCOR) 20 MG tablet Take 20 mg by mouth nightly   Yes Historical Provider, MD   potassium chloride (KLOR-CON M) 20 MEQ TBCR extended release tablet Take 20 mEq by mouth daily (with breakfast)   Yes Historical Provider, MD   metFORMIN (GLUCOPHAGE) 500 MG tablet Take 500 mg by mouth 2 times daily (with meals)   Yes Historical Provider, MD   Multiple Vitamins-Minerals (CENTRUM SILVER 50+WOMEN) TABS Take by mouth daily   Yes Historical Provider, MD   Magnesium 500 MG TABS Take 500 mg by mouth daily   Yes Historical Provider, MD   aspirin 81 MG tablet Take 81 mg by mouth daily   Yes Historical Provider, MD   NONFORMULARY Take by mouth daily Neora Vitamin   Yes Historical Provider, MD       Review of Systems   Constitutional:  Negative for chills, diaphoresis, fatigue, fever and unexpected weight change. Cardiovascular:  Negative for leg swelling. Gastrointestinal:  Negative for constipation, diarrhea, nausea and vomiting. Musculoskeletal:  Positive for gait problem. Negative for arthralgias and joint swelling. Skin:  Negative for color change, pallor, rash and wound. Neurological:  Negative for weakness and numbness. Lower Extremity Physical Examination:     Vitals: There were no vitals filed for this visit. General: AAO x 3 in NAD. Integument:   Warm, dry, supple, Bilateral.  Open lesion absent, Bilateral. hyperkeratosis sub heel right, sub 5th left. Feet improved. Dry scaly skin scattered. Vascular: DP and PT pulses palpable 2/4, Bilateral.  CFT <3 seconds, Bilateral.  Hair growth absent to the level of the digits, Bilateral.  Edema absent, Bilateral.  Varicosities absent, Bilateral. Erythema absent, Bilateral    Neurological:  Sensation present to light touch to level of digits, Bilateral.    Musculoskeletal: Muscle strength 5/5, Bilateral.  Right 3rd toe, sitting straight, hyperkeratosis resolved    Asessment: Patient is a 74 y.o. female with:   1. Benign neoplasm of skin of foot, right    2. Hammer toe of right foot    3. Psoriasis of nail    4. Pain in right foot    5. Benign neoplasm of skin of foot, left    6. Onychomycosis    7. Psoriasis    8. Corn of toe    9. Controlled type 2 diabetes with neuropathy ([facility] [address]        Plan: Pt was evaluated and examined.  Patient was given personalized discharge instructions. Nails 1-10 were debrided sharply in length and thickness with a nipper and , without incident. Pt will follow up in 4 weeks or sooner if any problems arise. Diagnosis was discussed with the pt and all of their questions were answered in detail. Proper foot hygiene and care was discussed with the pt. Informed patient on proper diabetic foot care and importance of tight glycemic control. Patient to check feet daily and contact the office with any questions/problems/concerns. Other comorbidity noted and will be managed by PCP. Aquaphor daily  Lidex cream    The lesion(s) was partially debrided, enucleated, and silver nitrate was applied with an apperature pad under occlusion. The patient will leave in place for 24-48 hours and than remove. The patient tolerated the procedure well and without complication. No orders of the defined types were placed in this encounter. No orders of the defined types were placed in this encounter.        RTC in 1 months  11/2/2022

## 2022-12-07 ENCOUNTER — OFFICE VISIT (OUTPATIENT)
Dept: PODIATRY | Age: 72
End: 2022-12-07
Payer: COMMERCIAL

## 2022-12-07 VITALS — HEIGHT: 65 IN | BODY MASS INDEX: 26.33 KG/M2 | WEIGHT: 158 LBS

## 2022-12-07 DIAGNOSIS — D23.71 BENIGN NEOPLASM OF SKIN OF FOOT, RIGHT: Primary | ICD-10-CM

## 2022-12-07 DIAGNOSIS — M79.671 PAIN IN RIGHT FOOT: ICD-10-CM

## 2022-12-07 DIAGNOSIS — B35.1 ONYCHOMYCOSIS: ICD-10-CM

## 2022-12-07 DIAGNOSIS — M20.41 HAMMER TOE OF RIGHT FOOT: ICD-10-CM

## 2022-12-07 DIAGNOSIS — L40.9 PSORIASIS OF NAIL: ICD-10-CM

## 2022-12-07 DIAGNOSIS — L40.9 PSORIASIS: ICD-10-CM

## 2022-12-07 DIAGNOSIS — D23.72 BENIGN NEOPLASM OF SKIN OF FOOT, LEFT: ICD-10-CM

## 2022-12-07 PROCEDURE — 1123F ACP DISCUSS/DSCN MKR DOCD: CPT | Performed by: PODIATRIST

## 2022-12-07 PROCEDURE — 99213 OFFICE O/P EST LOW 20 MIN: CPT | Performed by: PODIATRIST

## 2022-12-07 ASSESSMENT — ENCOUNTER SYMPTOMS
COLOR CHANGE: 0
DIARRHEA: 0
CONSTIPATION: 0
VOMITING: 0
NAUSEA: 0

## 2022-12-07 NOTE — PROGRESS NOTES
[facility]  Return Patient     Phil Payne 74 y.o. female that presents for follow-up of   Chief Complaint   Patient presents with    Callouses     B/l callous trim    Nail Problem     B/l nail trim/ last seen Dr. Brennan Art 7/27/2022     Follow up hyperkeratosis. dyshydrosis  both feet, foot pain, psoriasis. Using Aquaphor and Camea cream. Using lidex ointment. This has helped. Follow up flexor tenotomy of her right 3rd toe. She is doing very well, no pain. Has a new painful callous right heel. Left sub 5th met head. History of hammertoes, right 3rd toe is still painful with a corn. right worse than left, corn on toe, 3rd toe, very painful. Has spacers, toe crests, silicone toe     Currently denies F/C/N/V. Allergies   Allergen Reactions    Latex        Past Medical History:   Diagnosis Date    Arthritis     Bilateral carpal tunnel syndrome     Chronic kidney disease     Diabetes mellitus (HCC)     Diabetic nephropathy (HCC)     Hyperlipemia     Night cramps     Osteopenia     Seizure disorder ([facility] [address]     Tingling in extremities        Prior to Admission medications    Medication Sig Start Date End Date Taking?  Authorizing Provider   fluocinonide (LIDEX) 0.05 % ointment APPLY TO AFFECTED AREA TWO TIMES A DAY 10/18/21  Yes LAKISHA Ortiz M20 20 MEQ extended release tablet  9/10/20  Yes Historical Provider, MD   clobetasol (TEMOVATE) 0.05 % cream  5/22/20  Yes Historical Provider, MD   L-Methylfolate-Algae-B12-B6 (METANX PO) Take by mouth   Yes Historical Provider, MD   lisinopril (PRINIVIL;ZESTRIL) 2.5 MG tablet Take 2.5 mg by mouth daily   Yes Historical Provider, MD   simvastatin (ZOCOR) 20 MG tablet Take 20 mg by mouth nightly   Yes Historical Provider, MD   potassium chloride (KLOR-CON M) 20 MEQ TBCR extended release tablet Take 20 mEq by mouth daily (with breakfast)   Yes Historical Provider, MD   metFORMIN (GLUCOPHAGE) 500 MG tablet Take 500 mg by mouth 2 times daily (with meals)   Yes Historical Provider, MD   Multiple Vitamins-Minerals (CENTRUM SILVER 50+WOMEN) TABS Take by mouth daily   Yes Historical Provider, MD   Magnesium 500 MG TABS Take 500 mg by mouth daily   Yes Historical Provider, MD   aspirin 81 MG tablet Take 81 mg by mouth daily   Yes Historical Provider, MD   NONFORMULARY Take by mouth daily Neora Vitamin   Yes Historical Provider, MD       Review of Systems   Constitutional:  Negative for chills, diaphoresis, fatigue, fever and unexpected weight change. Cardiovascular:  Negative for leg swelling. Gastrointestinal:  Negative for constipation, diarrhea, nausea and vomiting. Musculoskeletal:  Positive for gait problem. Negative for arthralgias and joint swelling. Skin:  Negative for color change, pallor, rash and wound. Neurological:  Negative for weakness and numbness. Lower Extremity Physical Examination:     Vitals: There were no vitals filed for this visit. General: AAO x 3 in NAD. Integument:   Warm, dry, supple, Bilateral.  Open lesion absent, Bilateral. hyperkeratosis sub heel right, sub 5th left. Feet improved. Dry scaly skin scattered. Vascular: DP and PT pulses palpable 2/4, Bilateral.  CFT <3 seconds, Bilateral.  Hair growth absent to the level of the digits, Bilateral.  Edema absent, Bilateral.  Varicosities absent, Bilateral. Erythema absent, Bilateral    Neurological:  Sensation present to light touch to level of digits, Bilateral.    Musculoskeletal: Muscle strength 5/5, Bilateral.  Right 3rd toe, sitting straight, hyperkeratosis resolved    Asessment: Patient is a 74 y.o. female with:   1. Benign neoplasm of skin of foot, right    2. Hammer toe of right foot    3. Psoriasis of nail    4. Pain in right foot    5. Benign neoplasm of skin of foot, left    6. Onychomycosis    7. Psoriasis        Plan: Pt was evaluated and examined. Patient was given personalized discharge instructions.   Nails 1-10 were debrided sharply in length and thickness with a nipper and , without incident. Pt will follow up in 4 weeks or sooner if any problems arise. Diagnosis was discussed with the pt and all of their questions were answered in detail. Proper foot hygiene and care was discussed with the pt. Informed patient on proper diabetic foot care and importance of tight glycemic control. Patient to check feet daily and contact the office with any questions/problems/concerns. Other comorbidity noted and will be managed by PCP. Aquaphor daily  Lidex cream    The lesion(s) was partially debrided, enucleated, and silver nitrate was applied with an apperature pad under occlusion. The patient will leave in place for 24-48 hours and than remove. The patient tolerated the procedure well and without complication. No orders of the defined types were placed in this encounter. No orders of the defined types were placed in this encounter.        RTC in 1 months  12/7/2022

## 2022-12-08 RX ORDER — FLUOCINONIDE 0.5 MG/G
OINTMENT TOPICAL
Qty: 60 G | Refills: 5 | Status: SHIPPED | OUTPATIENT
Start: 2022-12-08

## 2023-01-04 ENCOUNTER — OFFICE VISIT (OUTPATIENT)
Dept: PODIATRY | Age: 73
End: 2023-01-04
Payer: COMMERCIAL

## 2023-01-04 VITALS — HEIGHT: 65 IN | WEIGHT: 158 LBS | BODY MASS INDEX: 26.33 KG/M2

## 2023-01-04 DIAGNOSIS — D23.71 BENIGN NEOPLASM OF SKIN OF FOOT, RIGHT: Primary | ICD-10-CM

## 2023-01-04 DIAGNOSIS — L40.9 PSORIASIS OF NAIL: ICD-10-CM

## 2023-01-04 DIAGNOSIS — M20.41 HAMMER TOE OF RIGHT FOOT: ICD-10-CM

## 2023-01-04 DIAGNOSIS — D23.72 BENIGN NEOPLASM OF SKIN OF FOOT, LEFT: ICD-10-CM

## 2023-01-04 DIAGNOSIS — L40.9 PSORIASIS: ICD-10-CM

## 2023-01-04 DIAGNOSIS — B35.1 ONYCHOMYCOSIS: ICD-10-CM

## 2023-01-04 DIAGNOSIS — M79.671 PAIN IN RIGHT FOOT: ICD-10-CM

## 2023-01-04 DIAGNOSIS — L84 CORN OF TOE: ICD-10-CM

## 2023-01-04 DIAGNOSIS — E11.40 CONTROLLED TYPE 2 DIABETES WITH NEUROPATHY (HCC): ICD-10-CM

## 2023-01-04 PROCEDURE — 99213 OFFICE O/P EST LOW 20 MIN: CPT | Performed by: PODIATRIST

## 2023-01-04 PROCEDURE — 1123F ACP DISCUSS/DSCN MKR DOCD: CPT | Performed by: PODIATRIST

## 2023-01-04 ASSESSMENT — ENCOUNTER SYMPTOMS
DIARRHEA: 0
NAUSEA: 0
CONSTIPATION: 0
VOMITING: 0
COLOR CHANGE: 0

## 2023-01-04 NOTE — PROGRESS NOTES
[facility]  Return Patient     Veronica Ventura 74 y.o. female that presents for follow-up of   Chief Complaint   Patient presents with    Callouses     Callous trim b/l, last saw Michael Riding 7/27/2022     Follow up hyperkeratosis. dyshydrosis  both feet, foot pain, psoriasis. Using Aquaphor and Camea cream. Using lidex ointment. This has helped. Follow up flexor tenotomy of her right 3rd toe. She is doing very well, no pain. Has a new painful callous right heel. Left sub 5th met head. History of hammertoes, right 3rd toe is still painful with a corn. right worse than left, corn on toe, 3rd toe, very painful. Has spacers, toe crests, silicone toe     Currently denies F/C/N/V. Allergies   Allergen Reactions    Latex        Past Medical History:   Diagnosis Date    Arthritis     Bilateral carpal tunnel syndrome     Chronic kidney disease     Diabetes mellitus (HCC)     Diabetic nephropathy (HCC)     Hyperlipemia     Night cramps     Osteopenia     Seizure disorder ([facility] [address]     Tingling in extremities        Prior to Admission medications    Medication Sig Start Date End Date Taking?  Authorizing Provider   fluocinonide (LIDEX) 0.05 % ointment APPLY TO AFFECTED AREA(S) TWO TIMES A DAY 12/8/22  Yes LAKISHA Martinez-CON M20 20 MEQ extended release tablet  9/10/20  Yes Historical Provider, MD   clobetasol (TEMOVATE) 0.05 % cream  5/22/20  Yes Historical Provider, MD   L-Methylfolate-Algae-B12-B6 (METANX PO) Take by mouth   Yes Historical Provider, MD   lisinopril (PRINIVIL;ZESTRIL) 2.5 MG tablet Take 2.5 mg by mouth daily   Yes Historical Provider, MD   simvastatin (ZOCOR) 20 MG tablet Take 20 mg by mouth nightly   Yes Historical Provider, MD   potassium chloride (KLOR-CON M) 20 MEQ TBCR extended release tablet Take 20 mEq by mouth daily (with breakfast)   Yes Historical Provider, MD   metFORMIN (GLUCOPHAGE) 500 MG tablet Take 500 mg by mouth 2 times daily (with meals)   Yes Historical Provider, MD   Multiple Vitamins-Minerals (CENTRUM SILVER 50+WOMEN) TABS Take by mouth daily   Yes Historical Provider, MD   Magnesium 500 MG TABS Take 500 mg by mouth daily   Yes Historical Provider, MD   aspirin 81 MG tablet Take 81 mg by mouth daily   Yes Historical Provider, MD   NONFORMULARY Take by mouth daily Neora Vitamin   Yes Historical Provider, MD       Review of Systems   Constitutional:  Negative for chills, diaphoresis, fatigue, fever and unexpected weight change. Cardiovascular:  Negative for leg swelling. Gastrointestinal:  Negative for constipation, diarrhea, nausea and vomiting. Musculoskeletal:  Positive for gait problem. Negative for arthralgias and joint swelling. Skin:  Negative for color change, pallor, rash and wound. Neurological:  Negative for weakness and numbness. Lower Extremity Physical Examination:     Vitals: There were no vitals filed for this visit. General: AAO x 3 in NAD. Integument:   Warm, dry, supple, Bilateral.  Open lesion absent, Bilateral. hyperkeratosis sub heel right, sub 5th left. Feet improved. Dry scaly skin scattered. Vascular: DP and PT pulses palpable 2/4, Bilateral.  CFT <3 seconds, Bilateral.  Hair growth absent to the level of the digits, Bilateral.  Edema absent, Bilateral.  Varicosities absent, Bilateral. Erythema absent, Bilateral    Neurological:  Sensation present to light touch to level of digits, Bilateral.    Musculoskeletal: Muscle strength 5/5, Bilateral.  Right 3rd toe, sitting straight, hyperkeratosis resolved    Asessment: Patient is a 74 y.o. female with:   1. Benign neoplasm of skin of foot, right    2. Hammer toe of right foot    3. Pain in right foot    4. Psoriasis of nail    5. Benign neoplasm of skin of foot, left    6. Onychomycosis    7. Psoriasis    8. Corn of toe    9. Controlled type 2 diabetes with neuropathy ([facility] [address]        Plan: Pt was evaluated and examined. Patient was given personalized discharge instructions. Nails 1-10 were debrided sharply in length and thickness with a nipper and , without incident. Pt will follow up in 4 weeks or sooner if any problems arise. Diagnosis was discussed with the pt and all of their questions were answered in detail. Proper foot hygiene and care was discussed with the pt. Informed patient on proper diabetic foot care and importance of tight glycemic control. Patient to check feet daily and contact the office with any questions/problems/concerns. Other comorbidity noted and will be managed by PCP. Aquaphor daily  Lidex cream    The lesion(s) was partially debrided, enucleated, and silver nitrate was applied with an apperature pad under occlusion. The patient will leave in place for 24-48 hours and than remove. The patient tolerated the procedure well and without complication. No orders of the defined types were placed in this encounter. No orders of the defined types were placed in this encounter.        RTC in 1 months  1/4/2023

## 2023-02-08 ENCOUNTER — OFFICE VISIT (OUTPATIENT)
Dept: PODIATRY | Age: 73
End: 2023-02-08
Payer: COMMERCIAL

## 2023-02-08 VITALS — BODY MASS INDEX: 26.33 KG/M2 | HEIGHT: 65 IN | WEIGHT: 158 LBS

## 2023-02-08 DIAGNOSIS — B35.1 ONYCHOMYCOSIS: ICD-10-CM

## 2023-02-08 DIAGNOSIS — L40.9 PSORIASIS OF NAIL: ICD-10-CM

## 2023-02-08 DIAGNOSIS — L84 CORN OF TOE: ICD-10-CM

## 2023-02-08 DIAGNOSIS — D23.72 BENIGN NEOPLASM OF SKIN OF FOOT, LEFT: ICD-10-CM

## 2023-02-08 DIAGNOSIS — D23.71 BENIGN NEOPLASM OF SKIN OF FOOT, RIGHT: Primary | ICD-10-CM

## 2023-02-08 DIAGNOSIS — M79.671 PAIN IN RIGHT FOOT: ICD-10-CM

## 2023-02-08 DIAGNOSIS — M20.41 HAMMER TOE OF RIGHT FOOT: ICD-10-CM

## 2023-02-08 DIAGNOSIS — L40.9 PSORIASIS: ICD-10-CM

## 2023-02-08 PROCEDURE — 1123F ACP DISCUSS/DSCN MKR DOCD: CPT | Performed by: PODIATRIST

## 2023-02-08 PROCEDURE — 99213 OFFICE O/P EST LOW 20 MIN: CPT | Performed by: PODIATRIST

## 2023-02-08 ASSESSMENT — ENCOUNTER SYMPTOMS
CONSTIPATION: 0
COLOR CHANGE: 0
DIARRHEA: 0
NAUSEA: 0
VOMITING: 0

## 2023-02-08 NOTE — PROGRESS NOTES
[facility]  Return Patient     Sharron Cabral 74 y.o. female that presents for follow-up of   Chief Complaint   Patient presents with    Callouses     Callous trim b/l     Other     Last saw Vikki Villarreal 7/27/22     Follow up hyperkeratosis. dyshydrosis  both feet, foot pain, psoriasis. Using Aquaphor and Camea cream. Using lidex ointment. This has helped. Follow up flexor tenotomy of her right 3rd toe. She is doing very well, no pain. Has a new painful callous right heel. Left sub 5th met head. History of hammertoes, right 3rd toe is still painful with a corn. right worse than left, corn on toe, 3rd toe, very painful. Has spacers, toe crests, silicone toe     Currently denies F/C/N/V. Allergies   Allergen Reactions    Latex        Past Medical History:   Diagnosis Date    Arthritis     Bilateral carpal tunnel syndrome     Chronic kidney disease     Diabetes mellitus (HCC)     Diabetic nephropathy (HCC)     Hyperlipemia     Night cramps     Osteopenia     Seizure disorder ([facility] [address]     Tingling in extremities        Prior to Admission medications    Medication Sig Start Date End Date Taking?  Authorizing Provider   fluocinonide (LIDEX) 0.05 % ointment APPLY TO AFFECTED AREA(S) TWO TIMES A DAY 12/8/22  Yes LAKISHA Ayala-CON M20 20 MEQ extended release tablet  9/10/20  Yes Historical Provider, MD   clobetasol (TEMOVATE) 0.05 % cream  5/22/20  Yes Historical Provider, MD   L-Methylfolate-Algae-B12-B6 (METANX PO) Take by mouth   Yes Historical Provider, MD   lisinopril (PRINIVIL;ZESTRIL) 2.5 MG tablet Take 2.5 mg by mouth daily   Yes Historical Provider, MD   simvastatin (ZOCOR) 20 MG tablet Take 20 mg by mouth nightly   Yes Historical Provider, MD   potassium chloride (KLOR-CON M) 20 MEQ TBCR extended release tablet Take 20 mEq by mouth daily (with breakfast)   Yes Historical Provider, MD   metFORMIN (GLUCOPHAGE) 500 MG tablet Take 500 mg by mouth 2 times daily (with meals)   Yes Historical Provider, MD   Multiple Vitamins-Minerals (CENTRUM SILVER 50+WOMEN) TABS Take by mouth daily   Yes Historical Provider, MD   Magnesium 500 MG TABS Take 500 mg by mouth daily   Yes Historical Provider, MD   aspirin 81 MG tablet Take 81 mg by mouth daily   Yes Historical Provider, MD   NONFORMULARY Take by mouth daily Neora Vitamin   Yes Historical Provider, MD       Review of Systems   Constitutional:  Negative for chills, diaphoresis, fatigue, fever and unexpected weight change. Cardiovascular:  Negative for leg swelling. Gastrointestinal:  Negative for constipation, diarrhea, nausea and vomiting. Musculoskeletal:  Positive for gait problem. Negative for arthralgias and joint swelling. Skin:  Negative for color change, pallor, rash and wound. Neurological:  Negative for weakness and numbness. Lower Extremity Physical Examination:     Vitals: There were no vitals filed for this visit. General: AAO x 3 in NAD. Integument:   Warm, dry, supple, Bilateral.  Open lesion absent, Bilateral. hyperkeratosis sub heel right, sub 5th left. Feet improved. Dry scaly skin scattered. Vascular: DP and PT pulses palpable 2/4, Bilateral.  CFT <3 seconds, Bilateral.  Hair growth absent to the level of the digits, Bilateral.  Edema absent, Bilateral.  Varicosities absent, Bilateral. Erythema absent, Bilateral    Neurological:  Sensation present to light touch to level of digits, Bilateral.    Musculoskeletal: Muscle strength 5/5, Bilateral.  Right 3rd toe, sitting straight, hyperkeratosis resolved    Asessment: Patient is a 74 y.o. female with:   1. Benign neoplasm of skin of foot, right    2. Hammer toe of right foot    3. Pain in right foot    4. Psoriasis of nail    5. Benign neoplasm of skin of foot, left    6. Onychomycosis    7. Psoriasis    8. Corn of toe        Plan: Pt was evaluated and examined. Patient was given personalized discharge instructions.   Nails 1-10 were debrided sharply in length and thickness with a nipper and , without incident. Pt will follow up in 4 weeks or sooner if any problems arise. Diagnosis was discussed with the pt and all of their questions were answered in detail. Proper foot hygiene and care was discussed with the pt. Informed patient on proper diabetic foot care and importance of tight glycemic control. Patient to check feet daily and contact the office with any questions/problems/concerns. Other comorbidity noted and will be managed by PCP. Aquaphor daily  Lidex cream    The lesion(s) was partially debrided, enucleated, and silver nitrate was applied with an apperature pad under occlusion. The patient will leave in place for 24-48 hours and than remove. The patient tolerated the procedure well and without complication. No orders of the defined types were placed in this encounter. No orders of the defined types were placed in this encounter.        RTC in 1 months  2/8/2023

## 2023-03-08 ENCOUNTER — OFFICE VISIT (OUTPATIENT)
Dept: PODIATRY | Age: 73
End: 2023-03-08
Payer: COMMERCIAL

## 2023-03-08 VITALS — BODY MASS INDEX: 26.33 KG/M2 | WEIGHT: 158 LBS | HEIGHT: 65 IN

## 2023-03-08 DIAGNOSIS — L40.9 PSORIASIS: ICD-10-CM

## 2023-03-08 DIAGNOSIS — D23.71 BENIGN NEOPLASM OF SKIN OF FOOT, RIGHT: Primary | ICD-10-CM

## 2023-03-08 DIAGNOSIS — L84 CORN OF TOE: ICD-10-CM

## 2023-03-08 DIAGNOSIS — M79.671 PAIN IN RIGHT FOOT: ICD-10-CM

## 2023-03-08 DIAGNOSIS — M20.41 HAMMER TOE OF RIGHT FOOT: ICD-10-CM

## 2023-03-08 DIAGNOSIS — E11.40 CONTROLLED TYPE 2 DIABETES WITH NEUROPATHY (HCC): ICD-10-CM

## 2023-03-08 DIAGNOSIS — D23.72 BENIGN NEOPLASM OF SKIN OF FOOT, LEFT: ICD-10-CM

## 2023-03-08 DIAGNOSIS — B35.1 ONYCHOMYCOSIS: ICD-10-CM

## 2023-03-08 DIAGNOSIS — L40.9 PSORIASIS OF NAIL: ICD-10-CM

## 2023-03-08 DIAGNOSIS — M79.672 PAIN IN LEFT FOOT: ICD-10-CM

## 2023-03-08 PROCEDURE — 99213 OFFICE O/P EST LOW 20 MIN: CPT | Performed by: PODIATRIST

## 2023-03-08 PROCEDURE — 1123F ACP DISCUSS/DSCN MKR DOCD: CPT | Performed by: PODIATRIST

## 2023-03-08 ASSESSMENT — ENCOUNTER SYMPTOMS
CONSTIPATION: 0
COLOR CHANGE: 0
DIARRHEA: 0
NAUSEA: 0
VOMITING: 0

## 2023-03-08 NOTE — PROGRESS NOTES
[facility]  Return Patient     Jeri Ny 74 y.o. female that presents for follow-up of   Chief Complaint   Patient presents with    Callouses     Callous trim b/l     Follow up hyperkeratosis. dyshydrosis  both feet, foot pain, psoriasis. Using Aquaphor and Camea cream. Using lidex ointment. This has helped. Follow up flexor tenotomy of her right 3rd toe. She is doing very well, no pain. Has a new painful callous right heel. Left sub 5th met head. History of hammertoes, right 3rd toe is still painful with a corn. right worse than left, corn on toe, 3rd toe, very painful. Has spacers, toe crests, silicone toe     Currently denies F/C/N/V. Allergies   Allergen Reactions    Latex        Past Medical History:   Diagnosis Date    Arthritis     Bilateral carpal tunnel syndrome     Chronic kidney disease     Diabetes mellitus (HCC)     Diabetic nephropathy (HCC)     Hyperlipemia     Night cramps     Osteopenia     Seizure disorder ([facility] [address]     Tingling in extremities        Prior to Admission medications    Medication Sig Start Date End Date Taking?  Authorizing Provider   fluocinonide (LIDEX) 0.05 % ointment APPLY TO AFFECTED AREA(S) TWO TIMES A DAY 12/8/22  Yes Wang Cousin, DPM   KLOR-CON M20 20 MEQ extended release tablet  9/10/20  Yes Historical Provider, MD   clobetasol (TEMOVATE) 0.05 % cream  5/22/20  Yes Historical Provider, MD   L-Methylfolate-Algae-B12-B6 (METANX PO) Take by mouth   Yes Historical Provider, MD   lisinopril (PRINIVIL;ZESTRIL) 2.5 MG tablet Take 2.5 mg by mouth daily   Yes Historical Provider, MD   simvastatin (ZOCOR) 20 MG tablet Take 20 mg by mouth nightly   Yes Historical Provider, MD   potassium chloride (KLOR-CON M) 20 MEQ TBCR extended release tablet Take 20 mEq by mouth daily (with breakfast)   Yes Historical Provider, MD   metFORMIN (GLUCOPHAGE) 500 MG tablet Take 500 mg by mouth 2 times daily (with meals)   Yes Historical Provider, MD   Multiple Vitamins-Minerals (CENTRUM SILVER 50+WOMEN) TABS Take by mouth daily   Yes Historical Provider, MD   Magnesium 500 MG TABS Take 500 mg by mouth daily   Yes Historical Provider, MD   aspirin 81 MG tablet Take 81 mg by mouth daily   Yes Historical Provider, MD   NONFORMULARY Take by mouth daily Neora Vitamin   Yes Historical Provider, MD       Review of Systems   Constitutional:  Negative for chills, diaphoresis, fatigue, fever and unexpected weight change. Cardiovascular:  Negative for leg swelling. Gastrointestinal:  Negative for constipation, diarrhea, nausea and vomiting. Musculoskeletal:  Positive for gait problem. Negative for arthralgias and joint swelling. Skin:  Negative for color change, pallor, rash and wound. Neurological:  Negative for weakness and numbness. Lower Extremity Physical Examination:     Vitals: There were no vitals filed for this visit. General: AAO x 3 in NAD. Integument:   Warm, dry, supple, Bilateral.  Open lesion absent, Bilateral. hyperkeratosis sub heel right, sub 5th left. Feet improved. Dry scaly skin scattered. Vascular: DP and PT pulses palpable 2/4, Bilateral.  CFT <3 seconds, Bilateral.  Hair growth absent to the level of the digits, Bilateral.  Edema absent, Bilateral.  Varicosities absent, Bilateral. Erythema absent, Bilateral    Neurological:  Sensation present to light touch to level of digits, Bilateral.    Musculoskeletal: Muscle strength 5/5, Bilateral.  Right 3rd toe, sitting straight, hyperkeratosis resolved    Asessment: Patient is a 74 y.o. female with:   1. Benign neoplasm of skin of foot, right    2. Hammer toe of right foot    3. Psoriasis of nail    4. Pain in right foot    5. Benign neoplasm of skin of foot, left    6. Onychomycosis    7. Psoriasis    8. Corn of toe    9. Controlled type 2 diabetes with neuropathy ([facility] [address]    10. Pain in left foot        Plan: Pt was evaluated and examined. Patient was given personalized discharge instructions.   Nails 1-10 were debrided sharply in length and thickness with a nipper and , without incident. Pt will follow up in 4 weeks or sooner if any problems arise. Diagnosis was discussed with the pt and all of their questions were answered in detail. Proper foot hygiene and care was discussed with the pt. Informed patient on proper diabetic foot care and importance of tight glycemic control. Patient to check feet daily and contact the office with any questions/problems/concerns. Other comorbidity noted and will be managed by PCP. Aquaphor daily  Lidex cream    The lesion(s) was partially debrided, enucleated, and silver nitrate was applied with an apperature pad under occlusion. The patient will leave in place for 24-48 hours and than remove. The patient tolerated the procedure well and without complication. No orders of the defined types were placed in this encounter. No orders of the defined types were placed in this encounter.        RTC in 1 months  3/8/2023

## 2023-03-30 RX ORDER — FLUOCINONIDE 0.5 MG/G
OINTMENT TOPICAL
Qty: 60 G | Refills: 5 | Status: SHIPPED | OUTPATIENT
Start: 2023-03-30

## 2023-05-10 ENCOUNTER — OFFICE VISIT (OUTPATIENT)
Dept: PODIATRY | Age: 73
End: 2023-05-10
Payer: COMMERCIAL

## 2023-05-10 VITALS — HEIGHT: 65 IN | WEIGHT: 158 LBS | BODY MASS INDEX: 26.33 KG/M2

## 2023-05-10 DIAGNOSIS — L40.9 PSORIASIS: ICD-10-CM

## 2023-05-10 DIAGNOSIS — D23.72 BENIGN NEOPLASM OF SKIN OF FOOT, LEFT: ICD-10-CM

## 2023-05-10 DIAGNOSIS — L40.9 PSORIASIS OF NAIL: ICD-10-CM

## 2023-05-10 DIAGNOSIS — D23.71 BENIGN NEOPLASM OF SKIN OF FOOT, RIGHT: Primary | ICD-10-CM

## 2023-05-10 DIAGNOSIS — M79.671 PAIN IN RIGHT FOOT: ICD-10-CM

## 2023-05-10 DIAGNOSIS — M79.672 PAIN IN LEFT FOOT: ICD-10-CM

## 2023-05-10 DIAGNOSIS — B35.1 ONYCHOMYCOSIS: ICD-10-CM

## 2023-05-10 DIAGNOSIS — M20.41 HAMMER TOE OF RIGHT FOOT: ICD-10-CM

## 2023-05-10 DIAGNOSIS — E11.40 CONTROLLED TYPE 2 DIABETES WITH NEUROPATHY (HCC): ICD-10-CM

## 2023-05-10 PROCEDURE — 99213 OFFICE O/P EST LOW 20 MIN: CPT | Performed by: PODIATRIST

## 2023-05-10 PROCEDURE — 1123F ACP DISCUSS/DSCN MKR DOCD: CPT | Performed by: PODIATRIST

## 2023-05-10 ASSESSMENT — ENCOUNTER SYMPTOMS
DIARRHEA: 0
NAUSEA: 0
VOMITING: 0
CONSTIPATION: 0
COLOR CHANGE: 0

## 2023-06-21 ENCOUNTER — OFFICE VISIT (OUTPATIENT)
Dept: PODIATRY | Age: 73
End: 2023-06-21
Payer: COMMERCIAL

## 2023-06-21 VITALS — BODY MASS INDEX: 26.33 KG/M2 | HEIGHT: 65 IN | WEIGHT: 158 LBS

## 2023-06-21 DIAGNOSIS — L40.9 PSORIASIS OF NAIL: ICD-10-CM

## 2023-06-21 DIAGNOSIS — E11.40 CONTROLLED TYPE 2 DIABETES WITH NEUROPATHY (HCC): ICD-10-CM

## 2023-06-21 DIAGNOSIS — M79.671 PAIN IN RIGHT FOOT: ICD-10-CM

## 2023-06-21 DIAGNOSIS — M79.672 PAIN IN LEFT FOOT: ICD-10-CM

## 2023-06-21 DIAGNOSIS — B35.1 ONYCHOMYCOSIS: Primary | ICD-10-CM

## 2023-06-21 DIAGNOSIS — D23.72 BENIGN NEOPLASM OF SKIN OF FOOT, LEFT: ICD-10-CM

## 2023-06-21 DIAGNOSIS — L40.9 PSORIASIS: ICD-10-CM

## 2023-06-21 DIAGNOSIS — D23.71 BENIGN NEOPLASM OF SKIN OF FOOT, RIGHT: ICD-10-CM

## 2023-06-21 PROCEDURE — 99213 OFFICE O/P EST LOW 20 MIN: CPT | Performed by: PODIATRIST

## 2023-06-21 PROCEDURE — 1123F ACP DISCUSS/DSCN MKR DOCD: CPT | Performed by: PODIATRIST

## 2023-06-21 ASSESSMENT — ENCOUNTER SYMPTOMS
VOMITING: 0
DIARRHEA: 0
CONSTIPATION: 0
COLOR CHANGE: 0
NAUSEA: 0

## 2023-06-21 NOTE — PROGRESS NOTES
[facility]  Return Patient     Noris Mcwilliams 74 y.o. female that presents for follow-up of   Chief Complaint   Patient presents with    Callouses     B/l callous trim     Follow up hyperkeratosis. dyshydrosis  both feet, foot pain, psoriasis. Using Aquaphor and Camea cream. Using lidex ointment. This has helped. Follow up flexor tenotomy of her right 3rd toe. She is doing very well, no pain. Has a new painful callous right heel. Left sub 5th met head. History of hammertoes, Has spacers, toe crests, silicone toe     Currently denies F/C/N/V. Allergies   Allergen Reactions    Latex        Past Medical History:   Diagnosis Date    Arthritis     Bilateral carpal tunnel syndrome     Chronic kidney disease     Diabetes mellitus (HCC)     Diabetic nephropathy (HCC)     Hyperlipemia     Night cramps     Osteopenia     Seizure disorder ([facility] [address]     Tingling in extremities        Prior to Admission medications    Medication Sig Start Date End Date Taking?  Authorizing Provider   fluocinonide (LIDEX) 0.05 % ointment APPLY TO AFFECTED AREA(S) TWO TIMES A DAY 3/30/23  Yes LAKISHA Camargo M20 20 MEQ extended release tablet  9/10/20  Yes Historical Provider, MD   clobetasol (TEMOVATE) 0.05 % cream  5/22/20  Yes Historical Provider, MD   L-Methylfolate-Algae-B12-B6 (METANX PO) Take by mouth   Yes Historical Provider, MD   lisinopril (PRINIVIL;ZESTRIL) 2.5 MG tablet Take 1 tablet by mouth daily   Yes Historical Provider, MD   simvastatin (ZOCOR) 20 MG tablet Take 1 tablet by mouth nightly   Yes Historical Provider, MD   potassium chloride (KLOR-CON M) 20 MEQ TBCR extended release tablet Take 1 tablet by mouth daily (with breakfast)   Yes Historical Provider, MD   metFORMIN (GLUCOPHAGE) 500 MG tablet Take 1 tablet by mouth 2 times daily (with meals)   Yes Historical Provider, MD   Multiple Vitamins-Minerals (CENTRUM SILVER 50+WOMEN) TABS Take by mouth daily   Yes Historical Provider, MD   Magnesium

## 2023-07-19 ENCOUNTER — OFFICE VISIT (OUTPATIENT)
Dept: PODIATRY | Age: 73
End: 2023-07-19
Payer: COMMERCIAL

## 2023-07-19 VITALS — HEIGHT: 65 IN | BODY MASS INDEX: 26.33 KG/M2 | WEIGHT: 158 LBS

## 2023-07-19 DIAGNOSIS — L40.9 PSORIASIS: ICD-10-CM

## 2023-07-19 DIAGNOSIS — L40.9 PSORIASIS OF NAIL: ICD-10-CM

## 2023-07-19 DIAGNOSIS — M79.671 PAIN IN RIGHT FOOT: ICD-10-CM

## 2023-07-19 DIAGNOSIS — D23.71 BENIGN NEOPLASM OF SKIN OF FOOT, RIGHT: ICD-10-CM

## 2023-07-19 DIAGNOSIS — D23.72 BENIGN NEOPLASM OF SKIN OF FOOT, LEFT: ICD-10-CM

## 2023-07-19 DIAGNOSIS — M79.672 PAIN IN LEFT FOOT: ICD-10-CM

## 2023-07-19 DIAGNOSIS — E11.40 CONTROLLED TYPE 2 DIABETES WITH NEUROPATHY (HCC): ICD-10-CM

## 2023-07-19 DIAGNOSIS — B35.1 ONYCHOMYCOSIS: Primary | ICD-10-CM

## 2023-07-19 PROCEDURE — 99213 OFFICE O/P EST LOW 20 MIN: CPT | Performed by: PODIATRIST

## 2023-07-19 PROCEDURE — 1123F ACP DISCUSS/DSCN MKR DOCD: CPT | Performed by: PODIATRIST

## 2023-07-19 ASSESSMENT — ENCOUNTER SYMPTOMS
DIARRHEA: 0
NAUSEA: 0
VOMITING: 0
COLOR CHANGE: 0
CONSTIPATION: 0

## 2023-07-19 NOTE — PROGRESS NOTES
mouth daily   Yes Historical Provider, MD   Magnesium 500 MG TABS Take 500 mg by mouth daily   Yes Historical Provider, MD   aspirin 81 MG tablet Take 1 tablet by mouth daily   Yes Historical Provider, MD   NONFORMULARY Take by mouth daily Neora Vitamin   Yes Historical Provider, MD       Review of Systems   Constitutional:  Negative for chills, diaphoresis, fatigue, fever and unexpected weight change. Cardiovascular:  Negative for leg swelling. Gastrointestinal:  Negative for constipation, diarrhea, nausea and vomiting. Musculoskeletal:  Positive for gait problem. Negative for arthralgias and joint swelling. Skin:  Negative for color change, pallor, rash and wound. Neurological:  Negative for weakness and numbness. Lower Extremity Physical Examination:     Vitals: There were no vitals filed for this visit. General: AAO x 3 in NAD. Integument:   Warm, dry, supple, Bilateral.  Open lesion absent, Bilateral. hyperkeratosis sub heel right, sub 5th left. Feet improved. Dry scaly skin scattered. Vascular: DP and PT pulses palpable 2/4, Bilateral.  CFT <3 seconds, Bilateral.  Hair growth absent to the level of the digits, Bilateral.  Edema absent, Bilateral.  Varicosities absent, Bilateral. Erythema absent, Bilateral    Neurological:  Sensation present to light touch to level of digits, Bilateral.    Musculoskeletal: Muscle strength 5/5, Bilateral.  Right 3rd toe, sitting straight, hyperkeratosis resolved    Asessment: Patient is a 74 y.o. female with:   1. Onychomycosis    2. Psoriasis of nail    3. Controlled type 2 diabetes with neuropathy ([address])    4. Psoriasis    5. Benign neoplasm of skin of foot, right    6. Benign neoplasm of skin of foot, left    7. Pain in right foot    8. Pain in left foot        Plan: Pt was evaluated and examined. Patient was given personalized discharge instructions. N Pt will follow up in 4 weeks or sooner if any problems arise.  Diagnosis was discussed with the pt and

## 2023-08-23 ENCOUNTER — OFFICE VISIT (OUTPATIENT)
Dept: PODIATRY | Age: 73
End: 2023-08-23
Payer: COMMERCIAL

## 2023-08-23 VITALS — BODY MASS INDEX: 26.33 KG/M2 | WEIGHT: 158 LBS | HEIGHT: 65 IN

## 2023-08-23 DIAGNOSIS — D23.71 BENIGN NEOPLASM OF SKIN OF FOOT, RIGHT: ICD-10-CM

## 2023-08-23 DIAGNOSIS — M79.672 PAIN IN LEFT FOOT: ICD-10-CM

## 2023-08-23 DIAGNOSIS — D23.72 BENIGN NEOPLASM OF SKIN OF FOOT, LEFT: ICD-10-CM

## 2023-08-23 DIAGNOSIS — L40.9 PSORIASIS OF NAIL: ICD-10-CM

## 2023-08-23 DIAGNOSIS — L40.9 PSORIASIS: ICD-10-CM

## 2023-08-23 DIAGNOSIS — B35.1 ONYCHOMYCOSIS: Primary | ICD-10-CM

## 2023-08-23 DIAGNOSIS — M79.671 PAIN IN RIGHT FOOT: ICD-10-CM

## 2023-08-23 DIAGNOSIS — E11.40 CONTROLLED TYPE 2 DIABETES WITH NEUROPATHY (HCC): ICD-10-CM

## 2023-08-23 PROCEDURE — 99213 OFFICE O/P EST LOW 20 MIN: CPT | Performed by: PODIATRIST

## 2023-08-23 PROCEDURE — 1123F ACP DISCUSS/DSCN MKR DOCD: CPT | Performed by: PODIATRIST

## 2023-08-23 ASSESSMENT — ENCOUNTER SYMPTOMS
VOMITING: 0
COLOR CHANGE: 0
DIARRHEA: 0
CONSTIPATION: 0
NAUSEA: 0

## 2023-08-23 NOTE — PROGRESS NOTES
daily   Yes Historical Provider, MD   Magnesium 500 MG TABS Take 500 mg by mouth daily   Yes Historical Provider, MD   aspirin 81 MG tablet Take 1 tablet by mouth daily   Yes Historical Provider, MD   NONFORMULARY Take by mouth daily Neora Vitamin   Yes Historical Provider, MD       Review of Systems   Constitutional:  Negative for chills, diaphoresis, fatigue, fever and unexpected weight change. Cardiovascular:  Negative for leg swelling. Gastrointestinal:  Negative for constipation, diarrhea, nausea and vomiting. Musculoskeletal:  Positive for gait problem. Negative for arthralgias and joint swelling. Skin:  Negative for color change, pallor, rash and wound. Neurological:  Negative for weakness and numbness. Lower Extremity Physical Examination:     Vitals: There were no vitals filed for this visit. General: AAO x 3 in NAD. Integument:   Warm, dry, supple, Bilateral.  Open lesion absent, Bilateral. hyperkeratosis sub heel right, sub 5th left. Feet improved. Dry scaly skin scattered. Vascular: DP and PT pulses palpable 2/4, Bilateral.  CFT <3 seconds, Bilateral.  Hair growth absent to the level of the digits, Bilateral.  Edema absent, Bilateral.  Varicosities absent, Bilateral. Erythema absent, Bilateral    Neurological:  Sensation present to light touch to level of digits, Bilateral.    Musculoskeletal: Muscle strength 5/5, Bilateral.  Right 3rd toe, sitting straight, hyperkeratosis resolved    Asessment: Patient is a 74 y.o. female with:   1. Onychomycosis    2. Psoriasis of nail    3. Controlled type 2 diabetes with neuropathy ([address])    4. Psoriasis    5. Benign neoplasm of skin of foot, right    6. Benign neoplasm of skin of foot, left    7. Pain in right foot    8. Pain in left foot        Plan: Pt was evaluated and examined. Patient was given personalized discharge instructions. N Pt will follow up in 4 weeks or sooner if any problems arise.  Diagnosis was discussed with the pt and all

## 2023-09-27 ENCOUNTER — OFFICE VISIT (OUTPATIENT)
Dept: PODIATRY | Age: 73
End: 2023-09-27
Payer: COMMERCIAL

## 2023-09-27 VITALS — WEIGHT: 158 LBS | HEIGHT: 65 IN | BODY MASS INDEX: 26.33 KG/M2

## 2023-09-27 DIAGNOSIS — L40.9 PSORIASIS OF NAIL: ICD-10-CM

## 2023-09-27 DIAGNOSIS — E11.40 CONTROLLED TYPE 2 DIABETES WITH NEUROPATHY (HCC): ICD-10-CM

## 2023-09-27 DIAGNOSIS — D23.71 BENIGN NEOPLASM OF SKIN OF FOOT, RIGHT: ICD-10-CM

## 2023-09-27 DIAGNOSIS — L40.9 PSORIASIS: ICD-10-CM

## 2023-09-27 DIAGNOSIS — M79.672 PAIN IN LEFT FOOT: ICD-10-CM

## 2023-09-27 DIAGNOSIS — D23.72 BENIGN NEOPLASM OF SKIN OF FOOT, LEFT: ICD-10-CM

## 2023-09-27 DIAGNOSIS — L84 CORN OF TOE: ICD-10-CM

## 2023-09-27 DIAGNOSIS — B35.1 ONYCHOMYCOSIS: Primary | ICD-10-CM

## 2023-09-27 DIAGNOSIS — M20.41 HAMMER TOE OF RIGHT FOOT: ICD-10-CM

## 2023-09-27 DIAGNOSIS — M79.671 PAIN IN RIGHT FOOT: ICD-10-CM

## 2023-09-27 PROCEDURE — 1123F ACP DISCUSS/DSCN MKR DOCD: CPT | Performed by: PODIATRIST

## 2023-09-27 PROCEDURE — 99212 OFFICE O/P EST SF 10 MIN: CPT | Performed by: PODIATRIST

## 2023-09-27 ASSESSMENT — ENCOUNTER SYMPTOMS
COLOR CHANGE: 0
NAUSEA: 0
VOMITING: 0
DIARRHEA: 0
CONSTIPATION: 0

## 2023-09-27 NOTE — PROGRESS NOTES
[facility]  Return Patient     Preeti Salazar 74 y.o. female that presents for follow-up of   Chief Complaint   Patient presents with    Callouses     Callous trim b/l      Follow up hyperkeratosis. dyshydrosis  both feet, foot pain, psoriasis. Using Aquaphor and Camea cream. Using lidex ointment. This has helped. Follow up flexor tenotomy of her right 3rd toe. She is doing very well, no pain. Has a new painful callous right heel. Left sub 5th met head. History of hammertoes, Has spacers, toe crests, silicone toe     Currently denies F/C/N/V. Allergies   Allergen Reactions    Latex        Past Medical History:   Diagnosis Date    Arthritis     Bilateral carpal tunnel syndrome     Chronic kidney disease     Diabetes mellitus (HCC)     Diabetic nephropathy (HCC)     Hyperlipemia     Night cramps     Osteopenia     Seizure disorder ([address])     Tingling in extremities        Prior to Admission medications    Medication Sig Start Date End Date Taking?  Authorizing Provider   fluocinonide (LIDEX) 0.05 % ointment APPLY TO AFFECTED AREA(S) TWO TIMES A DAY 3/30/23  Yes Theotis Lines, DPM   KLOR-CON M20 20 MEQ extended release tablet  9/10/20  Yes Historical Provider, MD   clobetasol (TEMOVATE) 0.05 % cream  5/22/20  Yes Historical Provider, MD   L-Methylfolate-Algae-B12-B6 (METANX PO) Take by mouth   Yes Historical Provider, MD   lisinopril (PRINIVIL;ZESTRIL) 2.5 MG tablet Take 1 tablet by mouth daily   Yes Historical Provider, MD   simvastatin (ZOCOR) 20 MG tablet Take 1 tablet by mouth nightly   Yes Historical Provider, MD   potassium chloride (KLOR-CON M) 20 MEQ TBCR extended release tablet Take 1 tablet by mouth daily (with breakfast)   Yes Historical Provider, MD   metFORMIN (GLUCOPHAGE) 500 MG tablet Take 1 tablet by mouth 2 times daily (with meals)   Yes Historical Provider, MD   Multiple Vitamins-Minerals (CENTRUM SILVER 50+WOMEN) TABS Take by mouth daily   Yes Historical Provider, MD   Magnesium

## 2023-11-01 ENCOUNTER — OFFICE VISIT (OUTPATIENT)
Dept: PODIATRY | Age: 73
End: 2023-11-01
Payer: COMMERCIAL

## 2023-11-01 VITALS — WEIGHT: 158 LBS | HEIGHT: 65 IN | BODY MASS INDEX: 26.33 KG/M2

## 2023-11-01 DIAGNOSIS — L40.9 PSORIASIS OF NAIL: ICD-10-CM

## 2023-11-01 DIAGNOSIS — M79.671 PAIN IN RIGHT FOOT: ICD-10-CM

## 2023-11-01 DIAGNOSIS — E11.40 CONTROLLED TYPE 2 DIABETES WITH NEUROPATHY (HCC): ICD-10-CM

## 2023-11-01 DIAGNOSIS — M79.672 PAIN IN LEFT FOOT: ICD-10-CM

## 2023-11-01 DIAGNOSIS — D23.71 BENIGN NEOPLASM OF SKIN OF FOOT, RIGHT: ICD-10-CM

## 2023-11-01 DIAGNOSIS — L40.9 PSORIASIS: ICD-10-CM

## 2023-11-01 DIAGNOSIS — D23.72 BENIGN NEOPLASM OF SKIN OF FOOT, LEFT: ICD-10-CM

## 2023-11-01 DIAGNOSIS — B35.1 ONYCHOMYCOSIS: Primary | ICD-10-CM

## 2023-11-01 PROCEDURE — 1123F ACP DISCUSS/DSCN MKR DOCD: CPT | Performed by: PODIATRIST

## 2023-11-01 PROCEDURE — 99213 OFFICE O/P EST LOW 20 MIN: CPT | Performed by: PODIATRIST

## 2023-11-01 ASSESSMENT — ENCOUNTER SYMPTOMS
CONSTIPATION: 0
COLOR CHANGE: 0
NAUSEA: 0
DIARRHEA: 0
VOMITING: 0

## 2023-11-01 NOTE — PROGRESS NOTES
[facility]  Return Patient     Juliette Gaffney 74 y.o. female that presents for follow-up of   Chief Complaint   Patient presents with    Callouses     Callous trim b/l      Follow up hyperkeratosis. dyshydrosis  both feet, foot pain, psoriasis. Using Aquaphor and Camea cream. Using lidex ointment. This has helped. Follow up flexor tenotomy of her right 3rd toe. She is doing very well, no pain. Has a new painful callous right heel. Left sub 5th met head. History of hammertoes, Has spacers, toe crests, silicone toe     Currently denies F/C/N/V. Allergies   Allergen Reactions    Latex        Past Medical History:   Diagnosis Date    Arthritis     Bilateral carpal tunnel syndrome     Chronic kidney disease     Diabetes mellitus (HCC)     Diabetic nephropathy (HCC)     Hyperlipemia     Night cramps     Osteopenia     Seizure disorder ([address])     Tingling in extremities        Prior to Admission medications    Medication Sig Start Date End Date Taking?  Authorizing Provider   fluocinonide (LIDEX) 0.05 % ointment APPLY TO AFFECTED AREA(S) TWO TIMES A DAY 3/30/23  Yes Lyle Prom A, DPM   KLOR-CON M20 20 MEQ extended release tablet  9/10/20  Yes ProviderPooja MD   clobetasol (TEMOVATE) 0.05 % cream  5/22/20  Yes ProviderPooja MD   L-Methylfolate-Algae-B12-B6 (METANX PO) Take by mouth   Yes Pooja Smith MD   lisinopril (PRINIVIL;ZESTRIL) 2.5 MG tablet Take 1 tablet by mouth daily   Yes Pooja Smith MD   simvastatin (ZOCOR) 20 MG tablet Take 1 tablet by mouth nightly   Yes ProviderPooja MD   potassium chloride (KLOR-CON M) 20 MEQ TBCR extended release tablet Take 1 tablet by mouth daily (with breakfast)   Yes Pooja Smith MD   metFORMIN (GLUCOPHAGE) 500 MG tablet Take 1 tablet by mouth 2 times daily (with meals)   Yes Pooja Smith MD   Multiple Vitamins-Minerals (CENTRUM SILVER 50+WOMEN) TABS Take by mouth daily   Yes Pooja Smith MD

## 2023-12-06 ENCOUNTER — OFFICE VISIT (OUTPATIENT)
Dept: PODIATRY | Age: 73
End: 2023-12-06
Payer: COMMERCIAL

## 2023-12-06 VITALS — WEIGHT: 158 LBS | HEIGHT: 65 IN | BODY MASS INDEX: 26.33 KG/M2

## 2023-12-06 DIAGNOSIS — B35.1 ONYCHOMYCOSIS: Primary | ICD-10-CM

## 2023-12-06 DIAGNOSIS — L40.9 PSORIASIS: ICD-10-CM

## 2023-12-06 DIAGNOSIS — M79.672 PAIN IN LEFT FOOT: ICD-10-CM

## 2023-12-06 DIAGNOSIS — L40.9 PSORIASIS OF NAIL: ICD-10-CM

## 2023-12-06 DIAGNOSIS — D23.72 BENIGN NEOPLASM OF SKIN OF FOOT, LEFT: ICD-10-CM

## 2023-12-06 DIAGNOSIS — D23.71 BENIGN NEOPLASM OF SKIN OF FOOT, RIGHT: ICD-10-CM

## 2023-12-06 DIAGNOSIS — M79.671 PAIN IN RIGHT FOOT: ICD-10-CM

## 2023-12-06 DIAGNOSIS — M20.41 HAMMER TOE OF RIGHT FOOT: ICD-10-CM

## 2023-12-06 DIAGNOSIS — E11.40 CONTROLLED TYPE 2 DIABETES WITH NEUROPATHY (HCC): ICD-10-CM

## 2023-12-06 DIAGNOSIS — L84 CORN OF TOE: ICD-10-CM

## 2023-12-06 PROCEDURE — 1123F ACP DISCUSS/DSCN MKR DOCD: CPT | Performed by: PODIATRIST

## 2023-12-06 PROCEDURE — 99212 OFFICE O/P EST SF 10 MIN: CPT | Performed by: PODIATRIST

## 2023-12-06 ASSESSMENT — ENCOUNTER SYMPTOMS
NAUSEA: 0
CONSTIPATION: 0
DIARRHEA: 0
COLOR CHANGE: 0
VOMITING: 0

## 2023-12-06 NOTE — PROGRESS NOTES
[facility]  Return Patient     Viri Holder 74 y.o. female that presents for follow-up of   Chief Complaint   Patient presents with    Callouses     B/l      Follow up hyperkeratosis. dyshydrosis  both feet, foot pain, psoriasis. Using Aquaphor and Camea cream. Using lidex ointment. This has helped. Follow up flexor tenotomy of her right 3rd toe. She is doing very well, no pain. Has a new painful callous right heel. Left sub 5th met head. History of hammertoes, Has spacers, toe crests, silicone toe     Currently denies F/C/N/V. Allergies   Allergen Reactions    Latex        Past Medical History:   Diagnosis Date    Arthritis     Bilateral carpal tunnel syndrome     Chronic kidney disease     Diabetes mellitus (HCC)     Diabetic nephropathy (HCC)     Hyperlipemia     Night cramps     Osteopenia     Seizure disorder ([address])     Tingling in extremities        Prior to Admission medications    Medication Sig Start Date End Date Taking?  Authorizing Provider   fluocinonide (LIDEX) 0.05 % ointment APPLY TO AFFECTED AREA(S) TWO TIMES A DAY 3/30/23  Yes LAKISHA Flores-CON M20 20 MEQ extended release tablet  9/10/20  Yes ProviderPooja MD   clobetasol (TEMOVATE) 0.05 % cream  5/22/20  Yes ProviderPooja MD   L-Methylfolate-Algae-B12-B6 (METANX PO) Take by mouth   Yes ProviderPooja MD   lisinopril (PRINIVIL;ZESTRIL) 2.5 MG tablet Take 1 tablet by mouth daily   Yes ProviderPooja MD   simvastatin (ZOCOR) 20 MG tablet Take 1 tablet by mouth nightly   Yes ProviderPooja MD   potassium chloride (KLOR-CON M) 20 MEQ TBCR extended release tablet Take 1 tablet by mouth daily (with breakfast)   Yes Pooja Smith MD   metFORMIN (GLUCOPHAGE) 500 MG tablet Take 1 tablet by mouth 2 times daily (with meals)   Yes ProviderPooja MD   Multiple Vitamins-Minerals (CENTRUM SILVER 50+WOMEN) TABS Take by mouth daily   Yes Pooja Smith MD   Magnesium 500

## 2024-01-03 ENCOUNTER — OFFICE VISIT (OUTPATIENT)
Dept: PODIATRY | Age: 74
End: 2024-01-03
Payer: MEDICARE

## 2024-01-03 VITALS — HEIGHT: 65 IN | BODY MASS INDEX: 26.33 KG/M2 | WEIGHT: 158 LBS

## 2024-01-03 DIAGNOSIS — B35.1 ONYCHOMYCOSIS: Primary | ICD-10-CM

## 2024-01-03 DIAGNOSIS — L84 CORN OF TOE: ICD-10-CM

## 2024-01-03 DIAGNOSIS — M79.671 PAIN IN RIGHT FOOT: ICD-10-CM

## 2024-01-03 DIAGNOSIS — M79.672 PAIN IN LEFT FOOT: ICD-10-CM

## 2024-01-03 DIAGNOSIS — E11.40 CONTROLLED TYPE 2 DIABETES WITH NEUROPATHY (HCC): ICD-10-CM

## 2024-01-03 DIAGNOSIS — D23.72 BENIGN NEOPLASM OF SKIN OF FOOT, LEFT: ICD-10-CM

## 2024-01-03 DIAGNOSIS — D23.71 BENIGN NEOPLASM OF SKIN OF FOOT, RIGHT: ICD-10-CM

## 2024-01-03 DIAGNOSIS — L40.9 PSORIASIS: ICD-10-CM

## 2024-01-03 DIAGNOSIS — L40.9 PSORIASIS OF NAIL: ICD-10-CM

## 2024-01-03 DIAGNOSIS — M20.41 HAMMER TOE OF RIGHT FOOT: ICD-10-CM

## 2024-01-03 PROCEDURE — 1123F ACP DISCUSS/DSCN MKR DOCD: CPT | Performed by: PODIATRIST

## 2024-01-03 PROCEDURE — 99213 OFFICE O/P EST LOW 20 MIN: CPT | Performed by: PODIATRIST

## 2024-01-03 ASSESSMENT — ENCOUNTER SYMPTOMS
NAUSEA: 0
VOMITING: 0
CONSTIPATION: 0
DIARRHEA: 0
COLOR CHANGE: 0

## 2024-01-03 NOTE — PROGRESS NOTES
[facility] Podiatry  Return Patient     Mariaelena Damon 73 y.o. female that presents for follow-up of   Chief Complaint   Patient presents with    Callouses     B/l callous trim, last seen Doreen Casillas MD 12/20/23     Follow up hyperkeratosis.dyshydrosis  both feet, foot pain, psoriasis. Using Aquaphor and Camea cream. Using lidex ointment. This has helped. Follow up flexor tenotomy of her right 3rd toe. She is doing very well, no pain. Has a new painful callous right heel. Left sub 5th met head.     History of hammertoes, Has spacers, toe crests, silicone toe     Currently denies F/C/N/V.     Allergies   Allergen Reactions    Latex        Past Medical History:   Diagnosis Date    Arthritis     Bilateral carpal tunnel syndrome     Chronic kidney disease     Diabetes mellitus (HCC)     Diabetic nephropathy (HCC)     Hyperlipemia     Night cramps     Osteopenia     Seizure disorder (HCC)     Tingling in extremities        Prior to Admission medications    Medication Sig Start Date End Date Taking? Authorizing Provider   fluocinonide (LIDEX) 0.05 % ointment APPLY TO AFFECTED AREA(S) TWO TIMES A DAY 3/30/23  Yes Colin Stovall DPM   KLOR-CON M20 20 MEQ extended release tablet  9/10/20  Yes Pooja Smith MD   clobetasol (TEMOVATE) 0.05 % cream  5/22/20  Yes Pooja Smith MD   L-Methylfolate-Algae-B12-B6 (METANX PO) Take by mouth   Yes Pooja Smith MD   lisinopril (PRINIVIL;ZESTRIL) 2.5 MG tablet Take 1 tablet by mouth daily   Yes Pooja Smith MD   simvastatin (ZOCOR) 20 MG tablet Take 1 tablet by mouth nightly   Yes Pooja Smith MD   potassium chloride (KLOR-CON M) 20 MEQ TBCR extended release tablet Take 1 tablet by mouth daily (with breakfast)   Yes Pooja Smith MD   metFORMIN (GLUCOPHAGE) 500 MG tablet Take 1 tablet by mouth 2 times daily (with meals)   Yes Pooja Smith MD   Multiple Vitamins-Minerals (CENTRUM SILVER 50+WOMEN) TABS Take by mouth

## 2024-02-14 ENCOUNTER — OFFICE VISIT (OUTPATIENT)
Dept: PODIATRY | Age: 74
End: 2024-02-14
Payer: MEDICARE

## 2024-02-14 VITALS — HEIGHT: 65 IN | BODY MASS INDEX: 26.33 KG/M2 | WEIGHT: 158 LBS

## 2024-02-14 DIAGNOSIS — M79.671 PAIN IN RIGHT FOOT: ICD-10-CM

## 2024-02-14 DIAGNOSIS — M79.672 PAIN IN LEFT FOOT: ICD-10-CM

## 2024-02-14 DIAGNOSIS — E11.40 CONTROLLED TYPE 2 DIABETES WITH NEUROPATHY (HCC): ICD-10-CM

## 2024-02-14 DIAGNOSIS — D23.72 BENIGN NEOPLASM OF SKIN OF FOOT, LEFT: ICD-10-CM

## 2024-02-14 DIAGNOSIS — L40.9 PSORIASIS: ICD-10-CM

## 2024-02-14 DIAGNOSIS — B35.1 ONYCHOMYCOSIS: Primary | ICD-10-CM

## 2024-02-14 DIAGNOSIS — L40.9 PSORIASIS OF NAIL: ICD-10-CM

## 2024-02-14 DIAGNOSIS — D23.71 BENIGN NEOPLASM OF SKIN OF FOOT, RIGHT: ICD-10-CM

## 2024-02-14 PROCEDURE — 1123F ACP DISCUSS/DSCN MKR DOCD: CPT | Performed by: PODIATRIST

## 2024-02-14 PROCEDURE — 99213 OFFICE O/P EST LOW 20 MIN: CPT | Performed by: PODIATRIST

## 2024-02-14 NOTE — PROGRESS NOTES
[facility] Podiatry  Return Patient     Mariaelena Damon 73 y.o. female that presents for follow-up of   Chief Complaint   Patient presents with    Callouses     B/l     Other      last saw Doreen Casillas MD 12/20/23          Follow up hyperkeratosis.dyshydrosis  both feet, foot pain, psoriasis. Using Aquaphor and Camea cream. Using lidex ointment. This has helped. Follow up flexor tenotomy of her right 3rd toe. She is doing very well, no pain. Has a new painful callous right heel. Left sub 5th met head.     History of hammertoes, Has spacers, toe crests, silicone toe     Currently denies F/C/N/V.     Allergies   Allergen Reactions    Latex        Past Medical History:   Diagnosis Date    Arthritis     Bilateral carpal tunnel syndrome     Chronic kidney disease     Diabetes mellitus (HCC)     Diabetic nephropathy (HCC)     Hyperlipemia     Night cramps     Osteopenia     Seizure disorder (HCC)     Tingling in extremities        Prior to Admission medications    Medication Sig Start Date End Date Taking? Authorizing Provider   fluocinonide (LIDEX) 0.05 % ointment APPLY TO AFFECTED AREA(S) TWO TIMES A DAY 3/30/23  Yes Colin Stovall DPM   KLOR-CON M20 20 MEQ extended release tablet  9/10/20  Yes Pooja Smith MD   clobetasol (TEMOVATE) 0.05 % cream  5/22/20  Yes ProviderPooja MD   L-Methylfolate-Algae-B12-B6 (METANX PO) Take by mouth   Yes Pooja Smith MD   lisinopril (PRINIVIL;ZESTRIL) 2.5 MG tablet Take 1 tablet by mouth daily   Yes Pooja Smith MD   simvastatin (ZOCOR) 20 MG tablet Take 1 tablet by mouth nightly   Yes Pooja Smith MD   potassium chloride (KLOR-CON M) 20 MEQ TBCR extended release tablet Take 1 tablet by mouth daily (with breakfast)   Yes Pooja Smith MD   metFORMIN (GLUCOPHAGE) 500 MG tablet Take 1 tablet by mouth 2 times daily (with meals)   Yes Pooja Smith MD   Multiple Vitamins-Minerals (CENTRUM SILVER 50+WOMEN) TABS Take by mouth

## 2024-03-20 ENCOUNTER — OFFICE VISIT (OUTPATIENT)
Dept: PODIATRY | Age: 74
End: 2024-03-20
Payer: MEDICARE

## 2024-03-20 VITALS — WEIGHT: 158 LBS | HEIGHT: 65 IN | BODY MASS INDEX: 26.33 KG/M2

## 2024-03-20 DIAGNOSIS — L84 CORN OF TOE: ICD-10-CM

## 2024-03-20 DIAGNOSIS — M79.672 PAIN IN LEFT FOOT: ICD-10-CM

## 2024-03-20 DIAGNOSIS — D23.72 BENIGN NEOPLASM OF SKIN OF FOOT, LEFT: ICD-10-CM

## 2024-03-20 DIAGNOSIS — E11.40 CONTROLLED TYPE 2 DIABETES WITH NEUROPATHY (HCC): ICD-10-CM

## 2024-03-20 DIAGNOSIS — M79.671 PAIN IN RIGHT FOOT: ICD-10-CM

## 2024-03-20 DIAGNOSIS — M20.41 HAMMER TOE OF RIGHT FOOT: ICD-10-CM

## 2024-03-20 DIAGNOSIS — B35.1 ONYCHOMYCOSIS: Primary | ICD-10-CM

## 2024-03-20 DIAGNOSIS — L40.9 PSORIASIS OF NAIL: ICD-10-CM

## 2024-03-20 DIAGNOSIS — L40.9 PSORIASIS: ICD-10-CM

## 2024-03-20 DIAGNOSIS — D23.71 BENIGN NEOPLASM OF SKIN OF FOOT, RIGHT: ICD-10-CM

## 2024-03-20 PROCEDURE — 99213 OFFICE O/P EST LOW 20 MIN: CPT | Performed by: PODIATRIST

## 2024-03-20 PROCEDURE — 1123F ACP DISCUSS/DSCN MKR DOCD: CPT | Performed by: PODIATRIST

## 2024-03-20 ASSESSMENT — ENCOUNTER SYMPTOMS
COLOR CHANGE: 0
DIARRHEA: 0
NAUSEA: 0
VOMITING: 0
CONSTIPATION: 0

## 2024-03-20 NOTE — PROGRESS NOTES
[facility] Podiat[facility]  Return Patient     Mariaelena Damon 73 y.o. female that presents for follow-up of   Chief Complaint   Patient presents with    Callouses     Callous trim b/l     Other     last saw Doreen Casillas MD 12/20/23         Diabetes     Blood sugar 93     Follow up hyperkeratosis.dyshydrosis  both feet, foot pain, psoriasis. Using Aquaphor and Camea cream. Using lidex ointment. This has helped. Follow up flexor tenotomy of her right 3rd toe. She is doing very well, no pain. Has a new painful callous right heel. Left sub 5th met head.     History of hammertoes, Has spacers, toe crests, silicone toe     Currently denies F/C/N/V.     Allergies   Allergen Reactions    Latex        Past Medical History:   Diagnosis Date    Arthritis     Bilateral carpal tunnel syndrome     Chronic kidney disease     Diabetes mellitus (HCC)     Diabetic nephropathy (HCC)     Hyperlipemia     Night cramps     Osteopenia     Seizure disorder (HCC)     Tingling in extremities        Prior to Admission medications    Medication Sig Start Date End Date Taking? Authorizing Provider   fluocinonide (LIDEX) 0.05 % ointment APPLY TO AFFECTED AREA(S) TWO TIMES A DAY 3/30/23  Yes Colin Stovall DPM KLOR-CON M20 20 MEQ extended release tablet  9/10/20  Yes Pooja Smith MD   clobetasol (TEMOVATE) 0.05 % cream  5/22/20  Yes Pooja Smith MD   L-Methylfolate-Algae-B12-B6 (METANX PO) Take by mouth   Yes Pooja Smith MD   lisinopril (PRINIVIL;ZESTRIL) 2.5 MG tablet Take 1 tablet by mouth daily   Yes Pooja Smith MD   simvastatin (ZOCOR) 20 MG tablet Take 1 tablet by mouth nightly   Yes Pooja Smith MD   potassium chloride (KLOR-CON M) 20 MEQ TBCR extended release tablet Take 1 tablet by mouth daily (with breakfast)   Yes Pooja Smith MD   metFORMIN (GLUCOPHAGE) 500 MG tablet Take 1 tablet by mouth 2 times daily (with meals)   Yes Pooja Smith MD   Multiple Vitamins-Minerals

## 2024-04-17 RX ORDER — FLUOCINONIDE 0.5 MG/G
OINTMENT TOPICAL
Qty: 60 G | Refills: 5 | Status: SHIPPED | OUTPATIENT
Start: 2024-04-17

## 2024-04-17 NOTE — TELEPHONE ENCOUNTER
Please Approve or Refuse.       Next Visit Date:  4/24/2024   Last Visit Date: 3/20/2024    Hemoglobin A1C (%)   Date Value   12/13/2023 6.2             ( goal A1C is < 7)   BP Readings from Last 3 Encounters:   01/14/20 116/60   11/12/19 104/60          (goal 120/80)  BUN   Date Value Ref Range Status   12/13/2023 16 (7     - 17) MG/DL Final     Creatinine   Date Value Ref Range Status   12/13/2023 0.55 (0.52  - 1.04) MG/DL Final     Potassium   Date Value Ref Range Status   12/20/2023 3.8 (3.5   - 5.1) MMOL/L Final

## 2024-04-24 ENCOUNTER — OFFICE VISIT (OUTPATIENT)
Dept: PODIATRY | Age: 74
End: 2024-04-24
Payer: MEDICARE

## 2024-04-24 VITALS — WEIGHT: 158 LBS | HEIGHT: 65 IN | BODY MASS INDEX: 26.33 KG/M2

## 2024-04-24 DIAGNOSIS — L84 CORN OF TOE: ICD-10-CM

## 2024-04-24 DIAGNOSIS — L40.9 PSORIASIS OF NAIL: ICD-10-CM

## 2024-04-24 DIAGNOSIS — B35.1 ONYCHOMYCOSIS: Primary | ICD-10-CM

## 2024-04-24 DIAGNOSIS — M20.41 HAMMER TOE OF RIGHT FOOT: ICD-10-CM

## 2024-04-24 DIAGNOSIS — L40.9 PSORIASIS: ICD-10-CM

## 2024-04-24 DIAGNOSIS — E11.40 CONTROLLED TYPE 2 DIABETES WITH NEUROPATHY (HCC): ICD-10-CM

## 2024-04-24 DIAGNOSIS — M79.671 PAIN IN RIGHT FOOT: ICD-10-CM

## 2024-04-24 DIAGNOSIS — D23.72 BENIGN NEOPLASM OF SKIN OF FOOT, LEFT: ICD-10-CM

## 2024-04-24 DIAGNOSIS — M79.672 PAIN IN LEFT FOOT: ICD-10-CM

## 2024-04-24 DIAGNOSIS — D23.71 BENIGN NEOPLASM OF SKIN OF FOOT, RIGHT: ICD-10-CM

## 2024-04-24 PROCEDURE — 1123F ACP DISCUSS/DSCN MKR DOCD: CPT | Performed by: PODIATRIST

## 2024-04-24 PROCEDURE — 99213 OFFICE O/P EST LOW 20 MIN: CPT | Performed by: PODIATRIST

## 2024-04-24 RX ORDER — IBANDRONATE SODIUM 150 MG/1
TABLET, FILM COATED ORAL
COMMUNITY
Start: 2024-04-16 | End: 2024-04-24

## 2024-04-24 ASSESSMENT — ENCOUNTER SYMPTOMS
VOMITING: 0
DIARRHEA: 0
NAUSEA: 0
CONSTIPATION: 0
COLOR CHANGE: 0

## 2024-04-24 NOTE — PROGRESS NOTES
[facility] Podiatry  Return Patient     Mariaelena Damon 73 y.o. female that presents for follow-up of   Chief Complaint   Patient presents with    Callouses     B/l    Other     last saw Doreen Casillas MD 12/20/23               Follow up hyperkeratosis.dyshydrosis  both feet, foot pain, psoriasis. Using Aquaphor and Camea cream. Using lidex ointment. This has helped. Follow up flexor tenotomy of her right 3rd toe. She is doing very well, no pain. Has a new painful callous right heel. Left sub 5th met head.     History of hammertoes, Has spacers, toe crests, silicone toe     Currently denies F/C/N/V.     Allergies   Allergen Reactions    Latex        Past Medical History:   Diagnosis Date    Arthritis     Bilateral carpal tunnel syndrome     Chronic kidney disease     Diabetes mellitus (HCC)     Diabetic nephropathy (HCC)     Hyperlipemia     Night cramps     Osteopenia     Seizure disorder (HCC)     Tingling in extremities        Prior to Admission medications    Medication Sig Start Date End Date Taking? Authorizing Provider   fluocinonide (LIDEX) 0.05 % ointment apply to affected area twice a day 4/17/24  Yes Colin Stovall DPM KLOR-CON M20 20 MEQ extended release tablet  9/10/20  Yes ProviderPooja MD   clobetasol (TEMOVATE) 0.05 % cream  5/22/20  Yes ProviderPooja MD   L-Methylfolate-Algae-B12-B6 (METANX PO) Take by mouth   Yes ProviderPooja MD   lisinopril (PRINIVIL;ZESTRIL) 2.5 MG tablet Take 1 tablet by mouth daily   Yes Pooja Smith MD   simvastatin (ZOCOR) 20 MG tablet Take 1 tablet by mouth nightly   Yes Provider, MD Pooja   potassium chloride (KLOR-CON M) 20 MEQ TBCR extended release tablet Take 1 tablet by mouth daily (with breakfast)   Yes Pooja Smith MD   metFORMIN (GLUCOPHAGE) 500 MG tablet Take 1 tablet by mouth 2 times daily (with meals)   Yes ProviderPooja MD   Multiple Vitamins-Minerals (CENTRUM SILVER 50+WOMEN) TABS Take by mouth

## 2024-05-29 ENCOUNTER — OFFICE VISIT (OUTPATIENT)
Dept: PODIATRY | Age: 74
End: 2024-05-29
Payer: MEDICARE

## 2024-05-29 VITALS — WEIGHT: 156 LBS | HEIGHT: 65 IN | BODY MASS INDEX: 25.99 KG/M2

## 2024-05-29 DIAGNOSIS — M79.671 PAIN IN RIGHT FOOT: ICD-10-CM

## 2024-05-29 DIAGNOSIS — L40.9 PSORIASIS OF NAIL: ICD-10-CM

## 2024-05-29 DIAGNOSIS — D23.71 BENIGN NEOPLASM OF SKIN OF FOOT, RIGHT: ICD-10-CM

## 2024-05-29 DIAGNOSIS — L84 CORN OF TOE: ICD-10-CM

## 2024-05-29 DIAGNOSIS — M79.672 PAIN IN LEFT FOOT: ICD-10-CM

## 2024-05-29 DIAGNOSIS — E11.40 CONTROLLED TYPE 2 DIABETES WITH NEUROPATHY (HCC): ICD-10-CM

## 2024-05-29 DIAGNOSIS — D23.72 BENIGN NEOPLASM OF SKIN OF FOOT, LEFT: ICD-10-CM

## 2024-05-29 DIAGNOSIS — L40.9 PSORIASIS: ICD-10-CM

## 2024-05-29 DIAGNOSIS — B35.1 ONYCHOMYCOSIS: Primary | ICD-10-CM

## 2024-05-29 DIAGNOSIS — M20.41 HAMMER TOE OF RIGHT FOOT: ICD-10-CM

## 2024-05-29 PROCEDURE — 1123F ACP DISCUSS/DSCN MKR DOCD: CPT | Performed by: PODIATRIST

## 2024-05-29 PROCEDURE — 99213 OFFICE O/P EST LOW 20 MIN: CPT | Performed by: PODIATRIST

## 2024-05-29 ASSESSMENT — ENCOUNTER SYMPTOMS
VOMITING: 0
COLOR CHANGE: 0
DIARRHEA: 0
NAUSEA: 0
CONSTIPATION: 0

## 2024-05-29 NOTE — PROGRESS NOTES
[facility] Podiatry  Return Patient     Mariaelena Damon 73 y.o. female that presents for follow-up of   Chief Complaint   Patient presents with    Callouses     B/l callous trim, last seen Doreen Casillas MD 12/20/23    Diabetes     Last blood sugar 96     Follow up hyperkeratosis.dyshydrosis  both feet, foot pain, psoriasis. Using Aquaphor and Camea cream. Using lidex ointment. This has helped. Follow up flexor tenotomy of her right 3rd toe. She is doing very well, no pain. Has a new painful callous right heel. Left sub 5th met head.     History of hammertoes, Has spacers, toe crests, silicone toe     Currently denies F/C/N/V.     Allergies   Allergen Reactions    Latex        Past Medical History:   Diagnosis Date    Arthritis     Bilateral carpal tunnel syndrome     Chronic kidney disease     Diabetes mellitus (HCC)     Diabetic nephropathy (HCC)     Hyperlipemia     Night cramps     Osteopenia     Seizure disorder (HCC)     Tingling in extremities        Prior to Admission medications    Medication Sig Start Date End Date Taking? Authorizing Provider   fluocinonide (LIDEX) 0.05 % ointment apply to affected area twice a day 4/17/24   Colin Stovall DPM KLOR-CON M20 20 MEQ extended release tablet  9/10/20   Pooja Smith MD   clobetasol (TEMOVATE) 0.05 % cream  5/22/20   Pooja Smith MD   L-Methylfolate-Algae-B12-B6 (METANX PO) Take by mouth    Pooja Smith MD   lisinopril (PRINIVIL;ZESTRIL) 2.5 MG tablet Take 1 tablet by mouth daily    Pooja Smith MD   simvastatin (ZOCOR) 20 MG tablet Take 1 tablet by mouth nightly    Pooja Smith MD   potassium chloride (KLOR-CON M) 20 MEQ TBCR extended release tablet Take 1 tablet by mouth daily (with breakfast)    Pooja Smith MD   metFORMIN (GLUCOPHAGE) 500 MG tablet Take 1 tablet by mouth 2 times daily (with meals)    Pooja Smith MD   Multiple Vitamins-Minerals (CENTRUM SILVER 50+WOMEN) TABS Take by mouth

## 2024-07-03 ENCOUNTER — OFFICE VISIT (OUTPATIENT)
Dept: PODIATRY | Age: 74
End: 2024-07-03
Payer: MEDICARE

## 2024-07-03 VITALS — HEIGHT: 65 IN | WEIGHT: 156 LBS | BODY MASS INDEX: 25.99 KG/M2

## 2024-07-03 DIAGNOSIS — B35.1 ONYCHOMYCOSIS: Primary | ICD-10-CM

## 2024-07-03 DIAGNOSIS — M79.672 PAIN IN LEFT FOOT: ICD-10-CM

## 2024-07-03 DIAGNOSIS — L40.9 PSORIASIS OF NAIL: ICD-10-CM

## 2024-07-03 DIAGNOSIS — L84 CORN OF TOE: ICD-10-CM

## 2024-07-03 DIAGNOSIS — E11.40 CONTROLLED TYPE 2 DIABETES WITH NEUROPATHY (HCC): ICD-10-CM

## 2024-07-03 DIAGNOSIS — M20.41 HAMMER TOE OF RIGHT FOOT: ICD-10-CM

## 2024-07-03 DIAGNOSIS — D23.71 BENIGN NEOPLASM OF SKIN OF FOOT, RIGHT: ICD-10-CM

## 2024-07-03 DIAGNOSIS — D23.72 BENIGN NEOPLASM OF SKIN OF FOOT, LEFT: ICD-10-CM

## 2024-07-03 DIAGNOSIS — M79.671 PAIN IN RIGHT FOOT: ICD-10-CM

## 2024-07-03 DIAGNOSIS — L40.9 PSORIASIS: ICD-10-CM

## 2024-07-03 PROCEDURE — 99213 OFFICE O/P EST LOW 20 MIN: CPT | Performed by: PODIATRIST

## 2024-07-03 PROCEDURE — 1123F ACP DISCUSS/DSCN MKR DOCD: CPT | Performed by: PODIATRIST

## 2024-07-03 RX ORDER — IBANDRONATE SODIUM 150 MG/1
TABLET, FILM COATED ORAL
COMMUNITY
Start: 2024-05-10 | End: 2024-07-03

## 2024-07-03 ASSESSMENT — ENCOUNTER SYMPTOMS
NAUSEA: 0
CONSTIPATION: 0
VOMITING: 0
COLOR CHANGE: 0
DIARRHEA: 0

## 2024-07-03 NOTE — PROGRESS NOTES
[facility] Podiatry  Return Patient     Mariaelena Damon 73 y.o. female that presents for follow-up of   Chief Complaint   Patient presents with    Nail Problem     Nail trim/last saw Dr.Samina Casillas 12/20/23    Callouses     B/l    Diabetes     Blood sugar 96      Follow up hyperkeratosis.dyshydrosis  both feet, foot pain, psoriasis. Using Aquaphor and Camea cream. Using lidex ointment. This has helped. Follow up flexor tenotomy of her right 3rd toe. She is doing very well, no pain. Has a new painful callous right heel. Left sub 5th met head.     History of hammertoes, Has spacers, toe crests, silicone toe     Currently denies F/C/N/V.     Allergies   Allergen Reactions    Latex        Past Medical History:   Diagnosis Date    Arthritis     Bilateral carpal tunnel syndrome     Chronic kidney disease     Diabetes mellitus (HCC)     Diabetic nephropathy (HCC)     Hyperlipemia     Night cramps     Osteopenia     Seizure disorder (HCC)     Tingling in extremities        Prior to Admission medications    Medication Sig Start Date End Date Taking? Authorizing Provider   fluocinonide (LIDEX) 0.05 % ointment apply to affected area twice a day 4/17/24  Yes Colin Stovall DPM KLOR-CON M20 20 MEQ extended release tablet  9/10/20  Yes Pooja Smith MD   clobetasol (TEMOVATE) 0.05 % cream  5/22/20  Yes Pooja Smith MD   L-Methylfolate-Algae-B12-B6 (METANX PO) Take by mouth   Yes Pooja Smith MD   lisinopril (PRINIVIL;ZESTRIL) 2.5 MG tablet Take 1 tablet by mouth daily   Yes Pooja Smith MD   simvastatin (ZOCOR) 20 MG tablet Take 1 tablet by mouth nightly   Yes ProviderPooja MD   potassium chloride (KLOR-CON M) 20 MEQ TBCR extended release tablet Take 1 tablet by mouth daily (with breakfast)   Yes Pooja Smith MD   metFORMIN (GLUCOPHAGE) 500 MG tablet Take 1 tablet by mouth 2 times daily (with meals)   Yes Pooja Smith MD   Multiple Vitamins-Minerals (CENTRUM

## 2024-08-07 ENCOUNTER — OFFICE VISIT (OUTPATIENT)
Dept: PODIATRY | Age: 74
End: 2024-08-07
Payer: MEDICARE

## 2024-08-07 VITALS — HEIGHT: 65 IN | BODY MASS INDEX: 25.96 KG/M2

## 2024-08-07 DIAGNOSIS — D23.71 BENIGN NEOPLASM OF SKIN OF FOOT, RIGHT: ICD-10-CM

## 2024-08-07 DIAGNOSIS — M79.672 PAIN IN LEFT FOOT: ICD-10-CM

## 2024-08-07 DIAGNOSIS — D23.72 BENIGN NEOPLASM OF SKIN OF FOOT, LEFT: ICD-10-CM

## 2024-08-07 DIAGNOSIS — L40.9 PSORIASIS: ICD-10-CM

## 2024-08-07 DIAGNOSIS — M20.41 HAMMER TOE OF RIGHT FOOT: ICD-10-CM

## 2024-08-07 DIAGNOSIS — L40.9 PSORIASIS OF NAIL: ICD-10-CM

## 2024-08-07 DIAGNOSIS — E11.40 CONTROLLED TYPE 2 DIABETES WITH NEUROPATHY (HCC): ICD-10-CM

## 2024-08-07 DIAGNOSIS — M79.671 PAIN IN RIGHT FOOT: ICD-10-CM

## 2024-08-07 DIAGNOSIS — L84 CORN OF TOE: ICD-10-CM

## 2024-08-07 DIAGNOSIS — B35.1 ONYCHOMYCOSIS: Primary | ICD-10-CM

## 2024-08-07 PROCEDURE — 99213 OFFICE O/P EST LOW 20 MIN: CPT | Performed by: PODIATRIST

## 2024-08-07 PROCEDURE — 1123F ACP DISCUSS/DSCN MKR DOCD: CPT | Performed by: PODIATRIST

## 2024-08-07 ASSESSMENT — ENCOUNTER SYMPTOMS
NAUSEA: 0
VOMITING: 0
CONSTIPATION: 0
DIARRHEA: 0
COLOR CHANGE: 0

## 2024-08-07 NOTE — PROGRESS NOTES
[facility] Podiatry  Return Patient     Mariaelena Damon 73 y.o. female that presents for follow-up of   Chief Complaint   Patient presents with    Nail Problem     B/l nail trim, last seen Doreen Casillas MD 12/20/23    Diabetes     Last blood sugar 97     Follow up hyperkeratosis.dyshydrosis  both feet, foot pain, psoriasis. Using Aquaphor and Camea cream. Using lidex ointment. This has helped. Follow up flexor tenotomy of her right 3rd toe. She is doing very well, no pain. Has a new painful callous right heel. Left sub 5th met head.     History of hammertoes, Has spacers, toe crests, silicone toe     Currently denies F/C/N/V.     Allergies   Allergen Reactions    Latex        Past Medical History:   Diagnosis Date    Arthritis     Bilateral carpal tunnel syndrome     Chronic kidney disease     Diabetes mellitus (HCC)     Diabetic nephropathy (HCC)     Hyperlipemia     Night cramps     Osteopenia     Seizure disorder (HCC)     Tingling in extremities        Prior to Admission medications    Medication Sig Start Date End Date Taking? Authorizing Provider   fluocinonide (LIDEX) 0.05 % ointment apply to affected area twice a day 4/17/24   Colin Stovall DPM KLOR-CON M20 20 MEQ extended release tablet  9/10/20   Pooja Smith MD   clobetasol (TEMOVATE) 0.05 % cream  5/22/20   Pooja Smith MD   L-Methylfolate-Algae-B12-B6 (METANX PO) Take by mouth    Pooja Smith MD   lisinopril (PRINIVIL;ZESTRIL) 2.5 MG tablet Take 1 tablet by mouth daily    Pooja Smith MD   simvastatin (ZOCOR) 20 MG tablet Take 1 tablet by mouth nightly    Pooja Smith MD   potassium chloride (KLOR-CON M) 20 MEQ TBCR extended release tablet Take 1 tablet by mouth daily (with breakfast)    Pooja Smith MD   metFORMIN (GLUCOPHAGE) 500 MG tablet Take 1 tablet by mouth 2 times daily (with meals)    Pooja Smith MD   Multiple Vitamins-Minerals (CENTRUM SILVER 50+WOMEN) TABS Take by mouth

## 2024-09-11 ENCOUNTER — OFFICE VISIT (OUTPATIENT)
Dept: PODIATRY | Age: 74
End: 2024-09-11
Payer: MEDICARE

## 2024-09-11 VITALS — WEIGHT: 156 LBS | BODY MASS INDEX: 25.99 KG/M2 | HEIGHT: 65 IN

## 2024-09-11 DIAGNOSIS — L40.9 PSORIASIS OF NAIL: ICD-10-CM

## 2024-09-11 DIAGNOSIS — D23.72 BENIGN NEOPLASM OF SKIN OF FOOT, LEFT: ICD-10-CM

## 2024-09-11 DIAGNOSIS — L84 CORN OF TOE: ICD-10-CM

## 2024-09-11 DIAGNOSIS — M79.671 PAIN IN RIGHT FOOT: ICD-10-CM

## 2024-09-11 DIAGNOSIS — D23.71 BENIGN NEOPLASM OF SKIN OF FOOT, RIGHT: ICD-10-CM

## 2024-09-11 DIAGNOSIS — B35.1 ONYCHOMYCOSIS: Primary | ICD-10-CM

## 2024-09-11 DIAGNOSIS — E11.40 CONTROLLED TYPE 2 DIABETES WITH NEUROPATHY (HCC): ICD-10-CM

## 2024-09-11 DIAGNOSIS — M79.672 PAIN IN LEFT FOOT: ICD-10-CM

## 2024-09-11 DIAGNOSIS — L40.9 PSORIASIS: ICD-10-CM

## 2024-09-11 PROCEDURE — 11721 DEBRIDE NAIL 6 OR MORE: CPT | Performed by: PODIATRIST

## 2024-09-11 PROCEDURE — 99999 PR OFFICE/OUTPT VISIT,PROCEDURE ONLY: CPT | Performed by: PODIATRIST

## 2024-09-11 PROCEDURE — 17110 DESTRUCTION B9 LES UP TO 14: CPT | Performed by: PODIATRIST

## 2024-09-11 ASSESSMENT — ENCOUNTER SYMPTOMS
DIARRHEA: 0
VOMITING: 0
NAUSEA: 0
COLOR CHANGE: 0
CONSTIPATION: 0

## 2024-10-16 ENCOUNTER — OFFICE VISIT (OUTPATIENT)
Dept: PODIATRY | Age: 74
End: 2024-10-16
Payer: MEDICARE

## 2024-10-16 VITALS — HEIGHT: 65 IN | BODY MASS INDEX: 25.99 KG/M2 | WEIGHT: 156 LBS

## 2024-10-16 DIAGNOSIS — L40.9 PSORIASIS: ICD-10-CM

## 2024-10-16 DIAGNOSIS — D23.71 BENIGN NEOPLASM OF SKIN OF FOOT, RIGHT: ICD-10-CM

## 2024-10-16 DIAGNOSIS — L40.9 PSORIASIS OF NAIL: ICD-10-CM

## 2024-10-16 DIAGNOSIS — M79.672 PAIN IN LEFT FOOT: ICD-10-CM

## 2024-10-16 DIAGNOSIS — E11.40 CONTROLLED TYPE 2 DIABETES WITH NEUROPATHY (HCC): ICD-10-CM

## 2024-10-16 DIAGNOSIS — B35.1 ONYCHOMYCOSIS: Primary | ICD-10-CM

## 2024-10-16 DIAGNOSIS — L84 CORN OF TOE: ICD-10-CM

## 2024-10-16 DIAGNOSIS — D23.72 BENIGN NEOPLASM OF SKIN OF FOOT, LEFT: ICD-10-CM

## 2024-10-16 DIAGNOSIS — M20.41 HAMMER TOE OF RIGHT FOOT: ICD-10-CM

## 2024-10-16 DIAGNOSIS — M79.671 PAIN IN RIGHT FOOT: ICD-10-CM

## 2024-10-16 PROCEDURE — 17110 DESTRUCTION B9 LES UP TO 14: CPT | Performed by: PODIATRIST

## 2024-10-16 PROCEDURE — 11721 DEBRIDE NAIL 6 OR MORE: CPT | Performed by: PODIATRIST

## 2024-10-16 PROCEDURE — 99999 PR OFFICE/OUTPT VISIT,PROCEDURE ONLY: CPT | Performed by: PODIATRIST

## 2024-10-16 ASSESSMENT — ENCOUNTER SYMPTOMS
CONSTIPATION: 0
NAUSEA: 0
COLOR CHANGE: 0
DIARRHEA: 0
VOMITING: 0

## 2024-10-16 NOTE — PROGRESS NOTES
(CENTRUM SILVER 50+WOMEN) TABS Take by mouth daily   Yes ProviderPooja MD   Magnesium 500 MG TABS Take 500 mg by mouth daily   Yes Pooja Smith MD   aspirin 81 MG tablet Take 1 tablet by mouth daily   Yes Pooja Smith MD   NONFORMULARY Take by mouth daily Neora Vitamin   Yes Pooja Smith MD       Review of Systems   Constitutional:  Negative for chills, diaphoresis, fatigue, fever and unexpected weight change.   Cardiovascular:  Negative for leg swelling.   Gastrointestinal:  Negative for constipation, diarrhea, nausea and vomiting.   Musculoskeletal:  Positive for gait problem. Negative for arthralgias and joint swelling.   Skin:  Negative for color change, pallor, rash and wound.   Neurological:  Negative for weakness and numbness.       Lower Extremity Physical Examination:     Vitals: There were no vitals filed for this visit.  General: AAO x 3 in NAD.     Integument:   Warm, dry, supple, Bilateral.  Open lesion absent, Bilateral. hyperkeratosis sub heel right, sub 5th left. Feet improved. Dry scaly skin scattered.     Sites of Onychomycosis Involvement (Check affected area)  [x] [x] [x] [x] [x] [x] [x] [x] [x] [x]  5 4 3 2 1 1 2 3 4 5                          Right                                        Left    Thickness  [x] [x] [x] [x] [x] [x] [x] [x] [x] [x]  5 4 3 2 1 1 2 3 4 5                         Right                                        Left    Dystrophic Changes                                                                 [x] [x] [x] [x] [x] [x] [x] [x] [x] [x]  5 4 3 2 1 1 2 3 4 5                         Right                                        Left    Color                                                                  [x] [x] [x] [x] [x] [x] [x] [x] [x] [x]  5 4 3 2 1 1 2 3 4 5                          Right                                        Left    Incurvation/Ingrowin

## 2024-11-20 ENCOUNTER — OFFICE VISIT (OUTPATIENT)
Dept: PODIATRY | Age: 74
End: 2024-11-20
Payer: MEDICARE

## 2024-11-20 VITALS — HEIGHT: 65 IN | WEIGHT: 156 LBS | BODY MASS INDEX: 25.99 KG/M2

## 2024-11-20 DIAGNOSIS — M79.671 PAIN IN RIGHT FOOT: ICD-10-CM

## 2024-11-20 DIAGNOSIS — B35.1 ONYCHOMYCOSIS: Primary | ICD-10-CM

## 2024-11-20 DIAGNOSIS — D23.71 BENIGN NEOPLASM OF SKIN OF FOOT, RIGHT: ICD-10-CM

## 2024-11-20 DIAGNOSIS — E11.40 CONTROLLED TYPE 2 DIABETES WITH NEUROPATHY (HCC): ICD-10-CM

## 2024-11-20 DIAGNOSIS — M79.672 PAIN IN LEFT FOOT: ICD-10-CM

## 2024-11-20 DIAGNOSIS — D23.72 BENIGN NEOPLASM OF SKIN OF FOOT, LEFT: ICD-10-CM

## 2024-11-20 DIAGNOSIS — M20.41 HAMMER TOE OF RIGHT FOOT: ICD-10-CM

## 2024-11-20 DIAGNOSIS — L40.9 PSORIASIS: ICD-10-CM

## 2024-11-20 DIAGNOSIS — L40.9 PSORIASIS OF NAIL: ICD-10-CM

## 2024-11-20 DIAGNOSIS — L84 CORN OF TOE: ICD-10-CM

## 2024-11-20 PROCEDURE — 99213 OFFICE O/P EST LOW 20 MIN: CPT | Performed by: PODIATRIST

## 2024-11-20 PROCEDURE — 1126F AMNT PAIN NOTED NONE PRSNT: CPT | Performed by: PODIATRIST

## 2024-11-20 PROCEDURE — 1159F MED LIST DOCD IN RCRD: CPT | Performed by: PODIATRIST

## 2024-11-20 PROCEDURE — 3044F HG A1C LEVEL LT 7.0%: CPT | Performed by: PODIATRIST

## 2024-11-20 PROCEDURE — 1123F ACP DISCUSS/DSCN MKR DOCD: CPT | Performed by: PODIATRIST

## 2024-11-20 ASSESSMENT — ENCOUNTER SYMPTOMS
NAUSEA: 0
COLOR CHANGE: 0
DIARRHEA: 0
CONSTIPATION: 0
VOMITING: 0

## 2024-11-20 NOTE — PROGRESS NOTES
[facility] Podiatry  Return Patient     Mariaelena Damon 74 y.o. female that presents for follow-up of   Chief Complaint   Patient presents with    Callouses     B/l     Nail Problem     B/l nail trim/ last Dr. Doreen Casillas 11/5/2024     Follow up hyperkeratosis.dyshydrosis  both feet, foot pain, psoriasis. Using Aquaphor and Camea cream. Using lidex ointment. This has helped. Follow up flexor tenotomy of her right 3rd toe. She is doing very well, no pain. Has a new painful callous right heel. Left sub 5th met head. Would like her nails trimmed.     History of hammertoes, Has spacers, toe crests, silicone toe     Currently denies F/C/N/V.     Allergies   Allergen Reactions    Latex        Past Medical History:   Diagnosis Date    Arthritis     Bilateral carpal tunnel syndrome     Chronic kidney disease     Diabetes mellitus (HCC)     Diabetic nephropathy (HCC)     Hyperlipemia     Night cramps     Osteopenia     Seizure disorder (HCC)     Tingling in extremities        Prior to Admission medications    Medication Sig Start Date End Date Taking? Authorizing Provider   fluocinonide (LIDEX) 0.05 % ointment apply to affected area twice a day 4/17/24  Yes Colin Stovall DPM   KLOR-CON M20 20 MEQ extended release tablet  9/10/20  Yes Pooja Smith MD   clobetasol (TEMOVATE) 0.05 % cream  5/22/20  Yes Pooja Smith MD   L-Methylfolate-Algae-B12-B6 (METANX PO) Take by mouth   Yes Pooja Smith MD   lisinopril (PRINIVIL;ZESTRIL) 2.5 MG tablet Take 1 tablet by mouth daily   Yes Pooja Smith MD   simvastatin (ZOCOR) 20 MG tablet Take 1 tablet by mouth nightly   Yes Luis, MD Pooja   potassium chloride (KLOR-CON M) 20 MEQ TBCR extended release tablet Take 1 tablet by mouth daily (with breakfast)   Yes Pooja Smith MD   metFORMIN (GLUCOPHAGE) 500 MG tablet Take 1 tablet by mouth 2 times daily (with meals)   Yes Pooja Smith MD   Multiple Vitamins-Minerals

## 2025-01-08 ENCOUNTER — OFFICE VISIT (OUTPATIENT)
Dept: PODIATRY | Age: 75
End: 2025-01-08
Payer: MEDICARE

## 2025-01-08 VITALS — BODY MASS INDEX: 25.99 KG/M2 | WEIGHT: 156 LBS | HEIGHT: 65 IN

## 2025-01-08 DIAGNOSIS — B35.1 ONYCHOMYCOSIS: Primary | ICD-10-CM

## 2025-01-08 DIAGNOSIS — D23.72 BENIGN NEOPLASM OF SKIN OF FOOT, LEFT: ICD-10-CM

## 2025-01-08 DIAGNOSIS — E11.40 CONTROLLED TYPE 2 DIABETES WITH NEUROPATHY (HCC): ICD-10-CM

## 2025-01-08 DIAGNOSIS — M79.671 PAIN IN RIGHT FOOT: ICD-10-CM

## 2025-01-08 DIAGNOSIS — D23.71 BENIGN NEOPLASM OF SKIN OF FOOT, RIGHT: ICD-10-CM

## 2025-01-08 DIAGNOSIS — L40.9 PSORIASIS: ICD-10-CM

## 2025-01-08 DIAGNOSIS — M79.672 PAIN IN LEFT FOOT: ICD-10-CM

## 2025-01-08 DIAGNOSIS — L40.9 PSORIASIS OF NAIL: ICD-10-CM

## 2025-01-08 PROCEDURE — 1159F MED LIST DOCD IN RCRD: CPT | Performed by: PODIATRIST

## 2025-01-08 PROCEDURE — 1125F AMNT PAIN NOTED PAIN PRSNT: CPT | Performed by: PODIATRIST

## 2025-01-08 PROCEDURE — 1123F ACP DISCUSS/DSCN MKR DOCD: CPT | Performed by: PODIATRIST

## 2025-01-08 PROCEDURE — 99213 OFFICE O/P EST LOW 20 MIN: CPT | Performed by: PODIATRIST

## 2025-01-08 ASSESSMENT — ENCOUNTER SYMPTOMS
CONSTIPATION: 0
DIARRHEA: 0
VOMITING: 0
NAUSEA: 0
COLOR CHANGE: 0

## 2025-01-08 NOTE — PROGRESS NOTES
SILVER 50+WOMEN) TABS Take by mouth daily   Yes Pooja Smith MD   Magnesium 500 MG TABS Take 500 mg by mouth daily   Yes Pooja Smith MD   aspirin 81 MG tablet Take 1 tablet by mouth daily   Yes Pooja Smith MD   NONFORMULARY Take by mouth daily Neora Vitamin   Yes Pooja Smith MD       Review of Systems   Constitutional:  Negative for chills, diaphoresis, fatigue, fever and unexpected weight change.   Cardiovascular:  Negative for leg swelling.   Gastrointestinal:  Negative for constipation, diarrhea, nausea and vomiting.   Musculoskeletal:  Positive for gait problem. Negative for arthralgias and joint swelling.   Skin:  Negative for color change, pallor, rash and wound.   Neurological:  Negative for weakness and numbness.       Lower Extremity Physical Examination:     Vitals: There were no vitals filed for this visit.  General: AAO x 3 in NAD.     Integument:   Warm, dry, supple, Bilateral.  Open lesion absent, Bilateral. hyperkeratosis sub heel right, sub 5th left. Feet improved. Dry scaly skin scattered.     Sites of Onychomycosis Involvement (Check affected area)  [x] [x] [x] [x] [x] [x] [x] [x] [x] [x]  5 4 3 2 1 1 2 3 4 5                          Right                                        Left    Thickness  [x] [x] [x] [x] [x] [x] [x] [x] [x] [x]  5 4 3 2 1 1 2 3 4 5                         Right                                        Left    Dystrophic Changes                                                                 [x] [x] [x] [x] [x] [x] [x] [x] [x] [x]  5 4 3 2 1 1 2 3 4 5                         Right                                        Left    Color                                                                  [x] [x] [x] [x] [x] [x] [x] [x] [x] [x]  5 4 3 2 1 1 2 3 4 5                          Right                                        Left    Incurvation/Ingrowin

## 2025-02-12 ENCOUNTER — OFFICE VISIT (OUTPATIENT)
Dept: PODIATRY | Age: 75
End: 2025-02-12
Payer: MEDICARE

## 2025-02-12 VITALS — HEIGHT: 65 IN | BODY MASS INDEX: 25.99 KG/M2 | WEIGHT: 156 LBS

## 2025-02-12 DIAGNOSIS — E11.40 CONTROLLED TYPE 2 DIABETES WITH NEUROPATHY (HCC): ICD-10-CM

## 2025-02-12 DIAGNOSIS — D23.72 BENIGN NEOPLASM OF SKIN OF FOOT, LEFT: ICD-10-CM

## 2025-02-12 DIAGNOSIS — B35.1 ONYCHOMYCOSIS: Primary | ICD-10-CM

## 2025-02-12 DIAGNOSIS — M79.671 PAIN IN RIGHT FOOT: ICD-10-CM

## 2025-02-12 DIAGNOSIS — L40.9 PSORIASIS: ICD-10-CM

## 2025-02-12 DIAGNOSIS — L40.9 PSORIASIS OF NAIL: ICD-10-CM

## 2025-02-12 DIAGNOSIS — M79.672 PAIN IN LEFT FOOT: ICD-10-CM

## 2025-02-12 DIAGNOSIS — D23.71 BENIGN NEOPLASM OF SKIN OF FOOT, RIGHT: ICD-10-CM

## 2025-02-12 PROCEDURE — 17110 DESTRUCTION B9 LES UP TO 14: CPT | Performed by: PODIATRIST

## 2025-02-12 PROCEDURE — 99999 PR OFFICE/OUTPT VISIT,PROCEDURE ONLY: CPT | Performed by: PODIATRIST

## 2025-02-12 PROCEDURE — 11721 DEBRIDE NAIL 6 OR MORE: CPT | Performed by: PODIATRIST

## 2025-02-12 ASSESSMENT — ENCOUNTER SYMPTOMS
DIARRHEA: 0
NAUSEA: 0
CONSTIPATION: 0
COLOR CHANGE: 0
VOMITING: 0

## 2025-02-12 NOTE — PROGRESS NOTES
[facility] Podiatry  Return Patient     Mariaelena Damon 74 y.o. female that presents for follow-up of   Chief Complaint   Patient presents with    Callouses     Callous trim      Follow up hyperkeratosis.dyshydrosis  both feet, foot pain, psoriasis. Using Aquaphor and Camea cream. Using lidex ointment. This has helped. Follow up flexor tenotomy of her right 3rd toe. She is doing very well, no pain. Has a new painful callous right heel. Left sub 5th met head. Would like her nails trimmed.     History of hammertoes, Has spacers, toe crests, silicone toe     Currently denies F/C/N/V.     Allergies   Allergen Reactions    Latex        Past Medical History:   Diagnosis Date    Arthritis     Bilateral carpal tunnel syndrome     Chronic kidney disease     Diabetes mellitus (HCC)     Diabetic nephropathy (HCC)     Hyperlipemia     Night cramps     Osteopenia     Seizure disorder (HCC)     Tingling in extremities        Prior to Admission medications    Medication Sig Start Date End Date Taking? Authorizing Provider   fluocinonide (LIDEX) 0.05 % ointment apply to affected area twice a day 4/17/24  Yes Colin Stovall DPM KLOR-CON M20 20 MEQ extended release tablet  9/10/20  Yes ProviderPooja MD   clobetasol (TEMOVATE) 0.05 % cream  5/22/20  Yes ProviderPooja MD   L-Methylfolate-Algae-B12-B6 (METANX PO) Take by mouth   Yes Pooja Smith MD   lisinopril (PRINIVIL;ZESTRIL) 2.5 MG tablet Take 1 tablet by mouth daily   Yes Pooja Smith MD   simvastatin (ZOCOR) 20 MG tablet Take 1 tablet by mouth nightly   Yes Provider, MD Pooja   potassium chloride (KLOR-CON M) 20 MEQ TBCR extended release tablet Take 1 tablet by mouth daily (with breakfast)   Yes Pooja Smith MD   metFORMIN (GLUCOPHAGE) 500 MG tablet Take 1 tablet by mouth 2 times daily (with meals)   Yes Pooja Smith MD   Multiple Vitamins-Minerals (CENTRUM SILVER 50+WOMEN) TABS Take by mouth daily   Yes

## 2025-03-19 ENCOUNTER — OFFICE VISIT (OUTPATIENT)
Dept: PODIATRY | Age: 75
End: 2025-03-19
Payer: MEDICARE

## 2025-03-19 VITALS — HEIGHT: 65 IN | BODY MASS INDEX: 25.99 KG/M2 | WEIGHT: 156 LBS

## 2025-03-19 DIAGNOSIS — L40.9 PSORIASIS OF NAIL: ICD-10-CM

## 2025-03-19 DIAGNOSIS — E11.40 CONTROLLED TYPE 2 DIABETES WITH NEUROPATHY (HCC): ICD-10-CM

## 2025-03-19 DIAGNOSIS — D23.71 BENIGN NEOPLASM OF SKIN OF FOOT, RIGHT: ICD-10-CM

## 2025-03-19 DIAGNOSIS — L84 CORN OF TOE: ICD-10-CM

## 2025-03-19 DIAGNOSIS — D23.72 BENIGN NEOPLASM OF SKIN OF FOOT, LEFT: ICD-10-CM

## 2025-03-19 DIAGNOSIS — B35.1 ONYCHOMYCOSIS: Primary | ICD-10-CM

## 2025-03-19 DIAGNOSIS — M20.41 HAMMER TOE OF RIGHT FOOT: ICD-10-CM

## 2025-03-19 DIAGNOSIS — L40.9 PSORIASIS: ICD-10-CM

## 2025-03-19 DIAGNOSIS — M79.671 PAIN IN RIGHT FOOT: ICD-10-CM

## 2025-03-19 DIAGNOSIS — M79.672 PAIN IN LEFT FOOT: ICD-10-CM

## 2025-03-19 PROCEDURE — 99213 OFFICE O/P EST LOW 20 MIN: CPT | Performed by: PODIATRIST

## 2025-03-19 PROCEDURE — 1123F ACP DISCUSS/DSCN MKR DOCD: CPT | Performed by: PODIATRIST

## 2025-03-19 PROCEDURE — 1159F MED LIST DOCD IN RCRD: CPT | Performed by: PODIATRIST

## 2025-03-19 PROCEDURE — 1125F AMNT PAIN NOTED PAIN PRSNT: CPT | Performed by: PODIATRIST

## 2025-03-19 ASSESSMENT — ENCOUNTER SYMPTOMS
DIARRHEA: 0
CONSTIPATION: 0
NAUSEA: 0
VOMITING: 0
COLOR CHANGE: 0

## 2025-03-19 NOTE — PROGRESS NOTES
[facility] Podiatry  Return Patient     Mariaelena Damon 74 y.o. female that presents for follow-up of   Chief Complaint   Patient presents with    Callouses     Callous trim b/l      Follow up hyperkeratosis.dyshydrosis  both feet, foot pain, psoriasis. Using Aquaphor and Camea cream. Using lidex ointment. This has helped. Follow up flexor tenotomy of her right 3rd toe. She is doing very well, no pain. Has a new painful callous right heel. Left sub 5th met head. Would like her nails trimmed.     History of hammertoes, Has spacers, toe crests, silicone toe     Currently denies F/C/N/V.     Allergies   Allergen Reactions    Latex        Past Medical History:   Diagnosis Date    Arthritis     Bilateral carpal tunnel syndrome     Chronic kidney disease     Diabetes mellitus (HCC)     Diabetic nephropathy (HCC)     Hyperlipemia     Night cramps     Osteopenia     Seizure disorder (HCC)     Tingling in extremities        Prior to Admission medications    Medication Sig Start Date End Date Taking? Authorizing Provider   fluocinonide (LIDEX) 0.05 % ointment apply to affected area twice a day 4/17/24  Yes Colin Stovall DPM KLOR-JERRY M20 20 MEQ extended release tablet  9/10/20  Yes Pooja Smith MD   clobetasol (TEMOVATE) 0.05 % cream  5/22/20  Yes ProviderPooja MD   L-Methylfolate-Algae-B12-B6 (METANX PO) Take by mouth   Yes ProviderPooja MD   lisinopril (PRINIVIL;ZESTRIL) 2.5 MG tablet Take 1 tablet by mouth daily   Yes ProviderPooja MD   simvastatin (ZOCOR) 20 MG tablet Take 1 tablet by mouth nightly   Yes ProviderPooja MD   potassium chloride (KLOR-CON M) 20 MEQ TBCR extended release tablet Take 1 tablet by mouth daily (with breakfast)   Yes Pooja Smith MD   metFORMIN (GLUCOPHAGE) 500 MG tablet Take 1 tablet by mouth 2 times daily (with meals)   Yes ProviderPooja MD   Multiple Vitamins-Minerals (CENTRUM SILVER 50+WOMEN) TABS Take by mouth daily   Yes

## 2025-04-23 ENCOUNTER — OFFICE VISIT (OUTPATIENT)
Dept: PODIATRY | Age: 75
End: 2025-04-23
Payer: MEDICARE

## 2025-04-23 VITALS — WEIGHT: 156 LBS | HEIGHT: 65 IN | BODY MASS INDEX: 25.99 KG/M2

## 2025-04-23 DIAGNOSIS — L84 CORN OF TOE: ICD-10-CM

## 2025-04-23 DIAGNOSIS — E11.40 CONTROLLED TYPE 2 DIABETES WITH NEUROPATHY (HCC): ICD-10-CM

## 2025-04-23 DIAGNOSIS — L40.9 PSORIASIS: ICD-10-CM

## 2025-04-23 DIAGNOSIS — M79.672 PAIN IN LEFT FOOT: ICD-10-CM

## 2025-04-23 DIAGNOSIS — D23.72 BENIGN NEOPLASM OF SKIN OF FOOT, LEFT: ICD-10-CM

## 2025-04-23 DIAGNOSIS — M20.41 HAMMER TOE OF RIGHT FOOT: ICD-10-CM

## 2025-04-23 DIAGNOSIS — M79.671 PAIN IN RIGHT FOOT: ICD-10-CM

## 2025-04-23 DIAGNOSIS — L40.9 PSORIASIS OF NAIL: ICD-10-CM

## 2025-04-23 DIAGNOSIS — B35.1 ONYCHOMYCOSIS: Primary | ICD-10-CM

## 2025-04-23 DIAGNOSIS — D23.71 BENIGN NEOPLASM OF SKIN OF FOOT, RIGHT: ICD-10-CM

## 2025-04-23 PROCEDURE — 1123F ACP DISCUSS/DSCN MKR DOCD: CPT | Performed by: PODIATRIST

## 2025-04-23 PROCEDURE — 1125F AMNT PAIN NOTED PAIN PRSNT: CPT | Performed by: PODIATRIST

## 2025-04-23 PROCEDURE — 1159F MED LIST DOCD IN RCRD: CPT | Performed by: PODIATRIST

## 2025-04-23 PROCEDURE — 99213 OFFICE O/P EST LOW 20 MIN: CPT | Performed by: PODIATRIST

## 2025-04-23 ASSESSMENT — ENCOUNTER SYMPTOMS
CONSTIPATION: 0
NAUSEA: 0
COLOR CHANGE: 0
VOMITING: 0
DIARRHEA: 0

## 2025-04-23 NOTE — PROGRESS NOTES
[facility] Podiatry  Return Patient     Mariaelena Damon 74 y.o. female that presents for follow-up of   Chief Complaint   Patient presents with    Callouses     B/l callous last seen Dr. Doreen Casillas 10/21/2024     Follow up hyperkeratosis.dyshydrosis  both feet, foot pain, psoriasis. Using Aquaphor and Camea cream. Using lidex ointment. This has helped.     She still gets painful callouses under her met heads, very painful. Likes to walk for exercise. Needs debridement about every 5-6 weeks.  Would like her nails trimmed.     History of hammertoes, Has spacers, toe crests, silicone toe caps    Currently denies F/C/N/V.     Allergies   Allergen Reactions    Latex        Past Medical History:   Diagnosis Date    Arthritis     Bilateral carpal tunnel syndrome     Chronic kidney disease     Diabetes mellitus (HCC)     Diabetic nephropathy (HCC)     Hyperlipemia     Night cramps     Osteopenia     Seizure disorder (HCC)     Tingling in extremities        Prior to Admission medications    Medication Sig Start Date End Date Taking? Authorizing Provider   fluocinonide (LIDEX) 0.05 % ointment apply to affected area twice a day 4/17/24  Yes Colin Stovall DPM   KLOR-CON M20 20 MEQ extended release tablet  9/10/20  Yes Pooja Smith MD   clobetasol (TEMOVATE) 0.05 % cream  5/22/20  Yes Pooja Smith MD   L-Methylfolate-Algae-B12-B6 (METANX PO) Take by mouth   Yes Pooja Smith MD   lisinopril (PRINIVIL;ZESTRIL) 2.5 MG tablet Take 1 tablet by mouth daily   Yes Pooja Smith MD   simvastatin (ZOCOR) 20 MG tablet Take 1 tablet by mouth nightly   Yes Pooja Smith MD   potassium chloride (KLOR-CON M) 20 MEQ TBCR extended release tablet Take 1 tablet by mouth daily (with breakfast)   Yes Pooja Smith MD   metFORMIN (GLUCOPHAGE) 500 MG tablet Take 1 tablet by mouth 2 times daily (with meals)   Yes Pooja Smith MD   Multiple Vitamins-Minerals (CENTRUM SILVER 50+WOMEN)

## 2025-05-28 ENCOUNTER — OFFICE VISIT (OUTPATIENT)
Dept: PODIATRY | Age: 75
End: 2025-05-28

## 2025-05-28 VITALS — HEIGHT: 65 IN | BODY MASS INDEX: 25.99 KG/M2 | WEIGHT: 156 LBS

## 2025-05-28 DIAGNOSIS — M79.672 PAIN IN LEFT FOOT: ICD-10-CM

## 2025-05-28 DIAGNOSIS — D23.71 BENIGN NEOPLASM OF SKIN OF FOOT, RIGHT: ICD-10-CM

## 2025-05-28 DIAGNOSIS — L40.9 PSORIASIS: ICD-10-CM

## 2025-05-28 DIAGNOSIS — M79.671 PAIN IN RIGHT FOOT: ICD-10-CM

## 2025-05-28 DIAGNOSIS — L40.9 PSORIASIS OF NAIL: ICD-10-CM

## 2025-05-28 DIAGNOSIS — B35.1 ONYCHOMYCOSIS: Primary | ICD-10-CM

## 2025-05-28 DIAGNOSIS — E11.40 CONTROLLED TYPE 2 DIABETES WITH NEUROPATHY (HCC): ICD-10-CM

## 2025-05-28 DIAGNOSIS — D23.72 BENIGN NEOPLASM OF SKIN OF FOOT, LEFT: ICD-10-CM

## 2025-05-28 ASSESSMENT — ENCOUNTER SYMPTOMS
CONSTIPATION: 0
VOMITING: 0
DIARRHEA: 0
NAUSEA: 0
COLOR CHANGE: 0

## 2025-05-28 NOTE — PROGRESS NOTES
[facility] Podiatry  Return Patient     Mariaelena Damon 74 y.o. female that presents for follow-up of   Chief Complaint   Patient presents with    Callouses     Callous trim b/l      Follow up hyperkeratosis.dyshydrosis  both feet, foot pain, psoriasis. Using Aquaphor and Camea cream. Using lidex ointment. This has helped.     She still gets painful callouses under her met heads, very painful. Likes to walk for exercise. Needs debridement about every 5-6 weeks.  Would like her nails trimmed.     History of hammertoes, Has spacers, toe crests, silicone toe caps    Currently denies F/C/N/V.     Allergies   Allergen Reactions    Latex        Past Medical History:   Diagnosis Date    Arthritis     Bilateral carpal tunnel syndrome     Chronic kidney disease     Diabetes mellitus (HCC)     Diabetic nephropathy (HCC)     Hyperlipemia     Night cramps     Osteopenia     Seizure disorder (HCC)     Tingling in extremities        Prior to Admission medications    Medication Sig Start Date End Date Taking? Authorizing Provider   fluocinonide (LIDEX) 0.05 % ointment apply to affected area twice a day 4/17/24  Yes Colin Stovall DPM KLOR-CON M20 20 MEQ extended release tablet  9/10/20  Yes ProviderPooja MD   clobetasol (TEMOVATE) 0.05 % cream  5/22/20  Yes ProviderPooja MD   L-Methylfolate-Algae-B12-B6 (METANX PO) Take by mouth   Yes ProviderPooja MD   lisinopril (PRINIVIL;ZESTRIL) 2.5 MG tablet Take 1 tablet by mouth daily   Yes ProviderPooja MD   simvastatin (ZOCOR) 20 MG tablet Take 1 tablet by mouth nightly   Yes Provider, MD Pooja   potassium chloride (KLOR-CON M) 20 MEQ TBCR extended release tablet Take 1 tablet by mouth daily (with breakfast)   Yes ProviderPooja MD   metFORMIN (GLUCOPHAGE) 500 MG tablet Take 1 tablet by mouth 2 times daily (with meals)   Yes ProviderPooja MD   Multiple Vitamins-Minerals (CENTRUM SILVER 50+WOMEN) TABS Take by mouth daily   Yes

## 2025-06-09 RX ORDER — FLUOCINONIDE 0.5 MG/G
OINTMENT TOPICAL 2 TIMES DAILY
Qty: 60 G | Refills: 5 | Status: SHIPPED | OUTPATIENT
Start: 2025-06-09

## 2025-06-09 NOTE — TELEPHONE ENCOUNTER
Please Approve or Refuse.       Next Visit Date:  7/2/2025   Last Visit Date: 5/28/2025    Hemoglobin A1C (%)   Date Value   10/21/2024 6.3   12/13/2023 6.2             ( goal A1C is < 7)   BP Readings from Last 3 Encounters:   01/14/20 116/60   11/12/19 104/60          (goal 120/80)  BUN   Date Value Ref Range Status   12/13/2023 16 (7     - 17) MG/DL Final     Creatinine   Date Value Ref Range Status   12/13/2023 0.55 (0.52  - 1.04) MG/DL Final     Potassium   Date Value Ref Range Status   12/20/2023 3.8 (3.5   - 5.1) MMOL/L Final

## 2025-07-02 ENCOUNTER — OFFICE VISIT (OUTPATIENT)
Dept: PODIATRY | Age: 75
End: 2025-07-02

## 2025-07-02 VITALS — HEIGHT: 65 IN | BODY MASS INDEX: 26.33 KG/M2 | WEIGHT: 158 LBS

## 2025-07-02 DIAGNOSIS — M20.41 HAMMER TOE OF RIGHT FOOT: ICD-10-CM

## 2025-07-02 DIAGNOSIS — E11.40 CONTROLLED TYPE 2 DIABETES WITH NEUROPATHY (HCC): ICD-10-CM

## 2025-07-02 DIAGNOSIS — B35.1 ONYCHOMYCOSIS: Primary | ICD-10-CM

## 2025-07-02 DIAGNOSIS — D23.71 BENIGN NEOPLASM OF SKIN OF FOOT, RIGHT: ICD-10-CM

## 2025-07-02 DIAGNOSIS — D23.72 BENIGN NEOPLASM OF SKIN OF FOOT, LEFT: ICD-10-CM

## 2025-07-02 DIAGNOSIS — M79.671 PAIN IN RIGHT FOOT: ICD-10-CM

## 2025-07-02 DIAGNOSIS — L40.9 PSORIASIS OF NAIL: ICD-10-CM

## 2025-07-02 DIAGNOSIS — L84 CORN OF TOE: ICD-10-CM

## 2025-07-02 DIAGNOSIS — L40.9 PSORIASIS: ICD-10-CM

## 2025-07-02 DIAGNOSIS — M79.672 PAIN IN LEFT FOOT: ICD-10-CM

## 2025-07-02 ASSESSMENT — ENCOUNTER SYMPTOMS
COLOR CHANGE: 0
DIARRHEA: 0
CONSTIPATION: 0
VOMITING: 0
NAUSEA: 0

## 2025-07-02 NOTE — PROGRESS NOTES
Orders Placed This Encounter   Procedures    DESTRUC BENIGN LESION, UP TO 14 LESIONS       No orders of the defined types were placed in this encounter.       RTC in 1 months  7/2/2025

## 2025-08-06 ENCOUNTER — OFFICE VISIT (OUTPATIENT)
Dept: PODIATRY | Age: 75
End: 2025-08-06

## 2025-08-06 VITALS — HEIGHT: 65 IN | BODY MASS INDEX: 26.33 KG/M2 | WEIGHT: 158 LBS

## 2025-08-06 DIAGNOSIS — M79.672 PAIN IN LEFT FOOT: ICD-10-CM

## 2025-08-06 DIAGNOSIS — B35.1 ONYCHOMYCOSIS: Primary | ICD-10-CM

## 2025-08-06 DIAGNOSIS — D23.72 BENIGN NEOPLASM OF SKIN OF FOOT, LEFT: ICD-10-CM

## 2025-08-06 DIAGNOSIS — D23.71 BENIGN NEOPLASM OF SKIN OF FOOT, RIGHT: ICD-10-CM

## 2025-08-06 DIAGNOSIS — E11.40 CONTROLLED TYPE 2 DIABETES WITH NEUROPATHY (HCC): ICD-10-CM

## 2025-08-06 DIAGNOSIS — L40.9 PSORIASIS OF NAIL: ICD-10-CM

## 2025-08-06 DIAGNOSIS — L84 CORN OF TOE: ICD-10-CM

## 2025-08-06 DIAGNOSIS — M79.671 PAIN IN RIGHT FOOT: ICD-10-CM

## 2025-08-06 DIAGNOSIS — L40.9 PSORIASIS: ICD-10-CM

## 2025-08-06 ASSESSMENT — ENCOUNTER SYMPTOMS
CONSTIPATION: 0
NAUSEA: 0
VOMITING: 0
DIARRHEA: 0
COLOR CHANGE: 0